# Patient Record
Sex: FEMALE | Race: WHITE | NOT HISPANIC OR LATINO | Employment: FULL TIME | URBAN - METROPOLITAN AREA
[De-identification: names, ages, dates, MRNs, and addresses within clinical notes are randomized per-mention and may not be internally consistent; named-entity substitution may affect disease eponyms.]

---

## 2017-09-18 RX ORDER — DROSPIRENONE AND ETHINYL ESTRADIOL 0.03MG-3MG
KIT ORAL
Qty: 84 TABLET | Refills: 1 | Status: SHIPPED | OUTPATIENT
Start: 2017-09-18 | End: 2017-12-12 | Stop reason: SDUPTHER

## 2017-12-11 ENCOUNTER — OFFICE VISIT (OUTPATIENT)
Dept: OBSTETRICS AND GYNECOLOGY | Facility: CLINIC | Age: 27
End: 2017-12-11

## 2017-12-11 VITALS
BODY MASS INDEX: 23.55 KG/M2 | WEIGHT: 128 LBS | SYSTOLIC BLOOD PRESSURE: 102 MMHG | DIASTOLIC BLOOD PRESSURE: 68 MMHG | HEIGHT: 62 IN

## 2017-12-11 DIAGNOSIS — Z30.41 ORAL CONTRACEPTIVE PILL SURVEILLANCE: ICD-10-CM

## 2017-12-11 DIAGNOSIS — Z11.3 SCREEN FOR STD (SEXUALLY TRANSMITTED DISEASE): ICD-10-CM

## 2017-12-11 DIAGNOSIS — R87.619 ABNORMAL CERVICAL PAPANICOLAOU SMEAR, UNSPECIFIED ABNORMAL PAP FINDING: ICD-10-CM

## 2017-12-11 DIAGNOSIS — Z00.00 ENCOUNTER FOR ANNUAL GENERAL MEDICAL EXAMINATION WITHOUT ABNORMAL FINDINGS IN ADULT: Primary | ICD-10-CM

## 2017-12-11 LAB
B-HCG UR QL: NEGATIVE
BILIRUB BLD-MCNC: NEGATIVE MG/DL
CLARITY, POC: CLEAR
COLOR UR: YELLOW
GLUCOSE UR STRIP-MCNC: NEGATIVE MG/DL
INTERNAL NEGATIVE CONTROL: NEGATIVE
INTERNAL POSITIVE CONTROL: POSITIVE
KETONES UR QL: NEGATIVE
LEUKOCYTE EST, POC: NEGATIVE
Lab: NORMAL
NITRITE UR-MCNC: NEGATIVE MG/ML
PH UR: 6 [PH] (ref 5–8)
PROT UR STRIP-MCNC: NEGATIVE MG/DL
RBC # UR STRIP: NEGATIVE /UL
SP GR UR: 1.02 (ref 1–1.03)
UROBILINOGEN UR QL: NORMAL

## 2017-12-11 PROCEDURE — 81002 URINALYSIS NONAUTO W/O SCOPE: CPT | Performed by: OBSTETRICS & GYNECOLOGY

## 2017-12-11 PROCEDURE — 81025 URINE PREGNANCY TEST: CPT | Performed by: OBSTETRICS & GYNECOLOGY

## 2017-12-11 PROCEDURE — 99395 PREV VISIT EST AGE 18-39: CPT | Performed by: OBSTETRICS & GYNECOLOGY

## 2017-12-11 RX ORDER — SPIRONOLACTONE 50 MG/1
TABLET, FILM COATED ORAL
COMMUNITY
Start: 2017-12-05 | End: 2018-11-12 | Stop reason: SDUPTHER

## 2017-12-11 RX ORDER — MOXIFLOXACIN 5 MG/ML
SOLUTION/ DROPS OPHTHALMIC
Refills: 0 | COMMUNITY
Start: 2017-09-18 | End: 2017-12-11

## 2017-12-11 RX ORDER — ALPRAZOLAM 0.5 MG/1
TABLET ORAL
COMMUNITY
Start: 2017-09-18 | End: 2017-12-11

## 2017-12-11 RX ORDER — PREDNISOLONE ACETATE 10 MG/ML
SUSPENSION/ DROPS OPHTHALMIC
COMMUNITY
Start: 2017-09-15 | End: 2017-12-11

## 2017-12-11 RX ORDER — FLUCONAZOLE 200 MG/1
TABLET ORAL
COMMUNITY
Start: 2017-11-03 | End: 2017-12-11

## 2017-12-12 PROBLEM — R87.619 ABNORMAL PAP SMEAR OF CERVIX: Status: ACTIVE | Noted: 2017-12-12

## 2017-12-12 LAB
CONV .: NORMAL
CYTOLOGIST CVX/VAG CYTO: NORMAL
CYTOLOGY CVX/VAG DOC THIN PREP: NORMAL
DX ICD CODE: NORMAL
HBV SURFACE AG SERPL QL IA: NEGATIVE
HCV AB S/CO SERPL IA: <0.1 S/CO RATIO (ref 0–0.9)
HIV 1 & 2 AB SER-IMP: NORMAL
HIV 1+2 AB+HIV1 P24 AG SERPL QL IA: NON REACTIVE
HSV1 IGG SER IA-ACNC: <0.91 INDEX (ref 0–0.9)
HSV2 IGG SER IA-ACNC: <0.91 INDEX (ref 0–0.9)
OTHER STN SPEC: NORMAL
PATH REPORT.FINAL DX SPEC: NORMAL
RPR SER QL: NORMAL
STAT OF ADQ CVX/VAG CYTO-IMP: NORMAL

## 2017-12-12 RX ORDER — DROSPIRENONE AND ETHINYL ESTRADIOL 0.03MG-3MG
KIT ORAL
Qty: 84 TABLET | Refills: 3 | Status: SHIPPED | OUTPATIENT
Start: 2017-12-12 | End: 2019-01-14 | Stop reason: SDUPTHER

## 2017-12-12 NOTE — PROGRESS NOTES
GYN Annual Exam     CC- Here for annual exam.     Anup Guzman is a 27 y.o. female established patient who presents for annual well woman exam. Periods are regular every 28-30 days, lasting 5 days. She still likes her OCPs. She had an abnormal pap that was LGSIl in  and ASCUS + HPV in 2016 and never came back for colpo despite extensive counseling. She says work keeps her too busy to return for colpo.     OB History      Para Term  AB Living    1 1 1   1    SAB TAB Ectopic Multiple Live Births                  Menarche: 12  Current contraception: OCP (estrogen/progesterone)  History of abnormal Pap smear: yes - 2016- ASCUS + HPV  History of abnormal mammogram: no  Family history of uterine, colon or ovarian cancer: no  Family history of breast cancer: no  H/o STDs: trich and genital warts  Gardasil: yes    Health Maintenance   Topic Date Due   • TDAP/TD VACCINES (1 - Tdap) 2008   • PAP SMEAR  2016   • INFLUENZA VACCINE  2017       Past Medical History:   Diagnosis Date   • Abnormal Pap smear of cervix 2017    Pt has had abnormal paps in  &  and has NOT had colpo or appropriate follow up.   • Acne    • HPV (human papilloma virus) infection     h/o genital warts   • Urogenital trichomoniasis        Past Surgical History:   Procedure Laterality Date   • INDUCED            Current Outpatient Prescriptions:   •  drospirenone-ethinyl estradiol (OCELLA) 3-0.03 MG per tablet, One by mouth per day, Disp: 84 tablet, Rfl: 3  •  spironolactone (ALDACTONE) 50 MG tablet, , Disp: , Rfl:     Allergies   Allergen Reactions   • Doxycycline        Social History   Substance Use Topics   • Smoking status: Never Smoker   • Smokeless tobacco: Never Used   • Alcohol use No       Family History   Problem Relation Age of Onset   • Breast cancer Neg Hx    • Ovarian cancer Neg Hx    • Colon cancer Neg Hx        Review of Systems   Constitutional: Negative for appetite change,  "fatigue, fever and unexpected weight change.   Respiratory: Negative for cough and shortness of breath.    Cardiovascular: Negative for chest pain and palpitations.   Gastrointestinal: Negative for abdominal distention, abdominal pain, constipation, diarrhea and nausea.   Endocrine: Negative for cold intolerance and heat intolerance.   Genitourinary: Negative for dyspareunia, dysuria, menstrual problem, pelvic pain and vaginal discharge.   Skin: Negative for color change and rash.   Neurological: Negative for headaches.   Psychiatric/Behavioral: Negative for dysphoric mood. The patient is not nervous/anxious.        /68  Ht 157.5 cm (62\")  Wt 58.1 kg (128 lb)  LMP 11/16/2017  Breastfeeding? No  BMI 23.41 kg/m2    Physical Exam   Constitutional: She is oriented to person, place, and time. She appears well-developed and well-nourished.   HENT:   Head: Normocephalic and atraumatic.   Neck: No thyromegaly present.   Cardiovascular: Normal rate, regular rhythm and normal heart sounds.    Pulmonary/Chest: Effort normal and breath sounds normal. Right breast exhibits no inverted nipple, no mass, no nipple discharge, no skin change and no tenderness. Left breast exhibits no inverted nipple, no mass, no nipple discharge, no skin change and no tenderness.   Abdominal: Soft. Bowel sounds are normal. She exhibits no distension and no mass. There is no tenderness. No hernia.   Genitourinary: Uterus normal. Pelvic exam was performed with patient supine. There is no rash, tenderness or lesion on the right labia. There is no rash, tenderness or lesion on the left labia. Cervix exhibits no motion tenderness, no discharge and no friability. Right adnexum displays no mass, no tenderness and no fullness. Left adnexum displays no mass, no tenderness and no fullness. No erythema, tenderness or bleeding in the vagina. No foreign body in the vagina. No signs of injury around the vagina. No vaginal discharge found. "   Neurological: She is oriented to person, place, and time.   Skin: Skin is warm and dry.   Psychiatric: She has a normal mood and affect. Her behavior is normal. Judgment and thought content normal.   Nursing note and vitals reviewed.         Assessment/Plan    1) GYN HM: check pap. Long d/w pt that she needs colpo of her pap is abnormal. D/w pt that the people who get cervical cancer are those that do not follow up.   SBE demonstrated and encouraged.  2) STD screening: accepts.  Condoms encouraged.  3) Contraception:  Refill OCPs  4) Family Planning: no plans. , encourage folic acid daily  5) Diet and Exercise discussed  6) Smoking Status: non smoker.  7) Advised pt that spironolactone is a teratogen and she she should use 2 forms of contraception.  8) MMG- plan age 39.  9)Follow up prn or 1 year       Anup was seen today for gynecologic exam.    Diagnoses and all orders for this visit:    Encounter for annual general medical examination without abnormal findings in adult  -     POC Urinalysis Dipstick  -     POC Pregnancy, Urine  -     Pap IG, Rfx HPV ASCU - ThinPrep Vial, Cervix  -     Chlamydia trachomatis, Neisseria gonorrhoeae, Trichomonas vaginalis, PCR - Urine, Urine, Clean Catch  -     Hepatitis B Surface Antigen  -     Hepatitis C Antibody  -     HIV-1 / O / 2 Ag / Antibody 4th Generation  -     HSV 1 & 2 - Specific Antibody, IgG  -     RPR    Abnormal cervical Papanicolaou smear, unspecified abnormal pap finding    Screen for STD (sexually transmitted disease)    Oral contraceptive pill surveillance    Other orders  -     drospirenone-ethinyl estradiol (OCELLA) 3-0.03 MG per tablet; One by mouth per day          Dasha Kirby MD  12/12/2017  6:41 PM

## 2017-12-14 LAB
C TRACH RRNA SPEC QL NAA+PROBE: NEGATIVE
N GONORRHOEA RRNA SPEC QL NAA+PROBE: NEGATIVE
T VAGINALIS RRNA SPEC QL NAA+PROBE: NEGATIVE

## 2018-11-12 ENCOUNTER — OFFICE VISIT (OUTPATIENT)
Dept: SURGERY | Facility: CLINIC | Age: 28
End: 2018-11-12

## 2018-11-12 VITALS
SYSTOLIC BLOOD PRESSURE: 112 MMHG | HEIGHT: 62 IN | WEIGHT: 126.4 LBS | BODY MASS INDEX: 23.26 KG/M2 | DIASTOLIC BLOOD PRESSURE: 66 MMHG

## 2018-11-12 DIAGNOSIS — K64.5 THROMBOSED HEMORRHOIDS: Primary | ICD-10-CM

## 2018-11-12 PROCEDURE — 99202 OFFICE O/P NEW SF 15 MIN: CPT | Performed by: SURGERY

## 2018-11-12 RX ORDER — SPIRONOLACTONE 50 MG/1
50 TABLET, FILM COATED ORAL
COMMUNITY
End: 2019-12-18

## 2018-11-12 NOTE — PROGRESS NOTES
PATIENT INFORMATION  Anup Guzman       - 1990    CHIEF COMPLAINT  Chief Complaint   Patient presents with   • Hemorrhoids       HISTORY OF PRESENT ILLNESS  HPI she complains of one week history of painful swelling in her perianal area.  She has had some mild bleeding in the past but nothing significant.  She denies any fevers or chills or drainage from the area.  She says the pain and swelling have improved over the last several days.        REVIEW OF SYSTEMS  Review of Systems she drinks a fair amount of water per day but does not necessarily eat much fiber in her diet.  Otherwise negative      ACTIVE PROBLEMS  Patient Active Problem List    Diagnosis   • Abnormal Pap smear of cervix [R87.619]   • Cerumen impaction [H61.20]   • CD (contact dermatitis) [L25.9]   • Blues [F32.9]   • Fatigue [R53.83]   • Congested [YHL3253]   • Abnormal presence of protein in urine [R80.9]   • Acne vulgaris [L70.0]   • Onychia of toe of left foot [L03.032]         PAST MEDICAL HISTORY  Past Medical History:   Diagnosis Date   • Abnormal Pap smear of cervix 2017    Pt has had abnormal paps in  &  and has NOT had colpo or appropriate follow up.   • Acne    • HPV (human papilloma virus) infection     h/o genital warts   • Urogenital trichomoniasis          SURGICAL HISTORY  Past Surgical History:   Procedure Laterality Date   • INDUCED            FAMILY HISTORY  Family History   Problem Relation Age of Onset   • Breast cancer Neg Hx    • Ovarian cancer Neg Hx    • Colon cancer Neg Hx          SOCIAL HISTORY  Social History     Occupational History   • Not on file   Tobacco Use   • Smoking status: Never Smoker   • Smokeless tobacco: Never Used   Substance and Sexual Activity   • Alcohol use: No   • Drug use: No   • Sexual activity: Yes     Partners: Male     Birth control/protection: OCP       Debilities/Disabilities Identified: None    Emotional Behavior: Appropriate    CURRENT MEDICATIONS    Current  "Outpatient Medications:   •  drospirenone-ethinyl estradiol (OCELLA) 3-0.03 MG per tablet, One by mouth per day, Disp: 84 tablet, Rfl: 3  •  spironolactone (ALDACTONE) 50 MG tablet, Take 50 mg by mouth., Disp: , Rfl:     ALLERGIES  Doxycycline    VITALS  Vitals:    11/12/18 1428   BP: 112/66   Weight: 57.3 kg (126 lb 6.4 oz)   Height: 157.5 cm (62.01\")       LAST RESULTS   Office Visit on 12/11/2017   Component Date Value Ref Range Status   • Color 12/11/2017 Yellow  Yellow, Straw, Dark Yellow, Joy Final   • Clarity, UA 12/11/2017 Clear  Clear Final   • Glucose, UA 12/11/2017 Negative  Negative, 1000 mg/dL (3+) mg/dL Final   • Bilirubin 12/11/2017 Negative  Negative Final   • Ketones, UA 12/11/2017 Negative  Negative Final   • Specific Gravity  12/11/2017 1.025  1.005 - 1.030 Final   • Blood, UA 12/11/2017 Negative  Negative Final   • pH, Urine 12/11/2017 6.0  5.0 - 8.0 Final   • Protein, POC 12/11/2017 Negative  Negative mg/dL Final   • Urobilinogen, UA 12/11/2017 Normal  Normal Final   • Leukocytes 12/11/2017 Negative  Negative Final   • Nitrite, UA 12/11/2017 Negative  Negative Final   • HCG, Urine, QL 12/11/2017 Negative  Negative Final   • Lot Number 12/11/2017 [id number]   Final   • Internal Positive Control 12/11/2017 Positive   Final   • Internal Negative Control 12/11/2017 Negative   Final   • Diagnosis 12/11/2017 Comment   Final    Comment: NEGATIVE FOR INTRAEPITHELIAL LESION AND MALIGNANCY.  CELLULAR CHANGES ASSOCIATED WITH INFLAMMATION ARE PRESENT.     • Specimen adequacy: 12/11/2017 Comment   Final    Comment: Satisfactory for evaluation.  Endocervical and/or squamous metaplastic  cells (endocervical component) are present.     • Clinician Provided ICD-10: 12/11/2017 Comment   Final    Z00.00   • Performed by: 12/11/2017 Comment   Final    Rody Jauregui Cytotechnologist (ASCP)   • . 12/11/2017 .   Final   • Note: 12/11/2017 Comment   Final    Comment: The Pap smear is a screening test designed to aid in " the detection of  premalignant and malignant conditions of the uterine cervix.  It is not a  diagnostic procedure and should not be used as the sole means of detecting  cervical cancer.  Both false-positive and false-negative reports do occur.     • Method: 12/11/2017 Comment   Final    Comment: This liquid based ThinPrep(R) pap test was screened with the  use of an image guided system.     • Conv .conv 12/11/2017 Comment   Final    Comment: The HPV DNA reflex criteria were not met with this specimen result  therefore, no HPV testing was performed.     • Chlamydia trachomatis, BALTAZAR 12/11/2017 Negative  Negative Final   • Gonococcus by BALTAZAR 12/11/2017 Negative  Negative Final   • Trichomonas vaginosis 12/11/2017 Negative  Negative Final   • Hepatitis B Surface Ag 12/11/2017 Negative  Negative Final   • Hep C Virus Ab 12/11/2017 <0.1  0.0 - 0.9 s/co ratio Final    Comment:                                   Negative:     < 0.8                               Indeterminate: 0.8 - 0.9                                    Positive:     > 0.9   The CDC recommends that a positive HCV antibody result   be followed up with a HCV Nucleic Acid Amplification   test (086806).     • HIV Screen 4th Gen w/RFX (Referenc* 12/11/2017 Non Reactive  Non Reactive Final   • HSV 1 IgG, Type Specific 12/11/2017 <0.91  0.00 - 0.90 index Final    Comment:                                  Negative        <0.91                                   Equivocal 0.91 - 1.09                                   Positive        >1.09   Note: Negative indicates no antibodies detected to   HSV-1. Equivocal may suggest early infection.  If   clinically appropriate, retest at later date. Positive   indicates antibodies detected to HSV-1.     • HSV II IgG,Type Specific 12/11/2017 <0.91  0.00 - 0.90 index Final    Comment:                                  Negative        <0.91                                   Equivocal 0.91 - 1.09                                    Positive        >1.09   Note: Negative indicates no antibodies detected to   HSV-2. Equivocal may suggest early infection.  If   clinically appropriate, retest at later date. Positive   indicates antibodies detected to HSV-2.     • RPR 12/11/2017 Comment  Non-Reactive Final    Non-Reactive     No results found.    PHYSICAL EXAM  Physical Exam this alert white female in no active distress.  She has a single thrombosed hemorrhoid with some also minor skin tags.  There is no evidence of abscess or cellulitis.  There is no evidence of necrosis.    ASSESSMENT  Thrombosed hemorrhoid      PLAN  The risks benefits options were discussed with her in detail.  Her time of disability will be last with conservative treatment since she is a week into this at this point.  I recommended she start 3 times a day sitz baths I do not feel that she needs a steroid cream.  I would like to see her back in 2-3 weeks.

## 2018-11-15 ENCOUNTER — TELEPHONE (OUTPATIENT)
Dept: GASTROENTEROLOGY | Facility: CLINIC | Age: 28
End: 2018-11-15

## 2018-12-12 ENCOUNTER — OFFICE VISIT (OUTPATIENT)
Dept: OBSTETRICS AND GYNECOLOGY | Facility: CLINIC | Age: 28
End: 2018-12-12

## 2018-12-12 VITALS
DIASTOLIC BLOOD PRESSURE: 60 MMHG | HEIGHT: 62 IN | WEIGHT: 125.8 LBS | BODY MASS INDEX: 23.15 KG/M2 | SYSTOLIC BLOOD PRESSURE: 98 MMHG

## 2018-12-12 DIAGNOSIS — Z01.419 WELL WOMAN EXAM WITH ROUTINE GYNECOLOGICAL EXAM: Primary | ICD-10-CM

## 2018-12-12 DIAGNOSIS — Z13.9 SCREENING FOR CONDITION: ICD-10-CM

## 2018-12-12 DIAGNOSIS — Z79.899 USE TERATOGENIC MEDICATION IN FEMALE OF REPRODUCTIVE AGE: ICD-10-CM

## 2018-12-12 DIAGNOSIS — Z30.41 ORAL CONTRACEPTIVE PILL SURVEILLANCE: ICD-10-CM

## 2018-12-12 DIAGNOSIS — Z11.3 SCREENING EXAMINATION FOR STD (SEXUALLY TRANSMITTED DISEASE): ICD-10-CM

## 2018-12-12 PROBLEM — R87.619 ABNORMAL PAP SMEAR OF CERVIX: Status: RESOLVED | Noted: 2017-12-12 | Resolved: 2018-12-12

## 2018-12-12 LAB
B-HCG UR QL: NEGATIVE
BILIRUB BLD-MCNC: NEGATIVE MG/DL
CLARITY, POC: CLEAR
COLOR UR: YELLOW
GLUCOSE UR STRIP-MCNC: NEGATIVE MG/DL
INTERNAL NEGATIVE CONTROL: NEGATIVE
INTERNAL POSITIVE CONTROL: POSITIVE
KETONES UR QL: NEGATIVE
LEUKOCYTE EST, POC: NEGATIVE
Lab: NORMAL
NITRITE UR-MCNC: NEGATIVE MG/ML
PH UR: 5 [PH] (ref 5–8)
PROT UR STRIP-MCNC: NEGATIVE MG/DL
RBC # UR STRIP: NEGATIVE /UL
SP GR UR: 1 (ref 1–1.03)
UROBILINOGEN UR QL: NORMAL

## 2018-12-12 PROCEDURE — 81025 URINE PREGNANCY TEST: CPT | Performed by: OBSTETRICS & GYNECOLOGY

## 2018-12-12 PROCEDURE — 99395 PREV VISIT EST AGE 18-39: CPT | Performed by: OBSTETRICS & GYNECOLOGY

## 2018-12-12 PROCEDURE — 81002 URINALYSIS NONAUTO W/O SCOPE: CPT | Performed by: OBSTETRICS & GYNECOLOGY

## 2018-12-12 NOTE — PROGRESS NOTES
GYN Annual Exam     CC- Here for annual exam.     Anup Guzman is a 28 y.o. female established patient who presents for annual well woman exam. Periods are regular every 28-30 days, lasting 5 days. She would like to take OCPs continuously to avoid periods and we discussed DUB a/w cont OCPs use and she understands. She has had abnormal paps in 2015 & 2016 but 2017 was normal. She had a bug bite on her mons 3 days ago, now resolving.     OB History      Para Term  AB Living    1 0 0   1 0    SAB TAB Ectopic Molar Multiple Live Births      1                Obstetric Comments    1 VIP          Menarche: 12  Current contraception: OCP (estrogen/progesterone)  History of abnormal Pap smear: yes -  & 2016 abnl, 2017 normal  History of abnormal mammogram: no  Family history of uterine, colon or ovarian cancer: no  Family history of breast cancer: no  H/o STDs: h/o trich and genital warts  Gardasil: 3/3  12/11/17- normal pap    Health Maintenance   Topic Date Due   • HEPATITIS A VACCINE ADULT (1 of 2) 2007   • TDAP/TD VACCINES (1 - Tdap) 2008   • INFLUENZA VACCINE  2018   • ANNUAL PHYSICAL  2018   • PAP SMEAR  2020       Past Medical History:   Diagnosis Date   • Abnormal Pap smear of cervix 2017- LGSIl, 2016- ASCUS + HPV,  normal   • Acne    • History of genital warts    • HPV (human papilloma virus) infection     h/o genital warts   • Urogenital trichomoniasis        Past Surgical History:   Procedure Laterality Date   • INDUCED            Current Outpatient Medications:   •  drospirenone-ethinyl estradiol (OCELLA) 3-0.03 MG per tablet, One by mouth per day, Disp: 84 tablet, Rfl: 3  •  hydrocortisone (ANUSOL-HC) 2.5 % rectal cream, Insert  into the rectum 2 (Two) Times a Day., Disp: 28.35 g, Rfl: 2  •  spironolactone (ALDACTONE) 50 MG tablet, Take 50 mg by mouth., Disp: , Rfl:     Allergies   Allergen Reactions   • Doxycycline Anaphylaxis  "      Social History     Tobacco Use   • Smoking status: Never Smoker   • Smokeless tobacco: Never Used   Substance Use Topics   • Alcohol use: No   • Drug use: No       Family History   Problem Relation Age of Onset   • No Known Problems Father    • No Known Problems Mother    • Breast cancer Neg Hx    • Ovarian cancer Neg Hx    • Colon cancer Neg Hx        Review of Systems   Constitutional: Negative for appetite change, fatigue, fever and unexpected weight change.   Respiratory: Negative for cough and shortness of breath.    Cardiovascular: Negative for chest pain and palpitations.   Gastrointestinal: Negative for abdominal distention, abdominal pain, constipation, diarrhea and nausea.   Endocrine: Negative for cold intolerance.   Genitourinary: Negative for dyspareunia, dysuria, menstrual problem, pelvic pain and vaginal discharge.   Skin: Positive for wound (bug bite). Negative for color change and rash.   Neurological: Negative for headaches.   Psychiatric/Behavioral: Negative for dysphoric mood. The patient is not nervous/anxious.        BP 98/60   Ht 157.5 cm (62\")   Wt 57.1 kg (125 lb 12.8 oz)   LMP 10/01/2018 (Approximate)   BMI 23.01 kg/m²     Physical Exam   Constitutional: She is oriented to person, place, and time. She appears well-developed and well-nourished.   HENT:   Head: Normocephalic and atraumatic.   Eyes: Conjunctivae are normal. No scleral icterus.   Neck: Neck supple. No thyromegaly present.   Cardiovascular: Normal rate and regular rhythm.   Pulmonary/Chest: Effort normal and breath sounds normal. Right breast exhibits no inverted nipple, no mass, no nipple discharge, no skin change and no tenderness. Left breast exhibits no inverted nipple, no mass, no nipple discharge, no skin change and no tenderness.   Abdominal: Soft. Bowel sounds are normal. She exhibits no distension and no mass. There is no tenderness. There is no rebound and no guarding. No hernia.   Genitourinary: Uterus " normal.       Pelvic exam was performed with patient supine. There is lesion on the right labia. There is no rash or tenderness on the right labia. There is no rash, tenderness or lesion on the left labia. Cervix exhibits no motion tenderness, no discharge and no friability. Right adnexum displays no mass, no tenderness and no fullness. Left adnexum displays no mass, no tenderness and no fullness. No erythema, tenderness or bleeding in the vagina. No foreign body in the vagina. No signs of injury around the vagina. No vaginal discharge found.   Neurological: She is alert and oriented to person, place, and time.   Skin: Skin is warm and dry.   Psychiatric: She has a normal mood and affect. Her behavior is normal. Judgment and thought content normal.   Nursing note and vitals reviewed.         Assessment/Plan    1) GYN HM: check pap/C/G/T   SBE demonstrated and encouraged.  2) STD screening: accepts Condoms encouraged.  3) Contraception:  Refill OCPS, 90days. DUB side effect of continuous OCP use d/w pt/   4) Family Planning: no plasn, encourage folic acid daily  5) Diet and Exercise discussed  6) Smoking Status: non smoker  7) Acne- on aldactone- d/w pt that this is a teratogen and she should be on 2 forms of birth control during use and off for 3 months prior to conception.   8) MMG-  Plana age 39  9)Follow up prn or 1 year  10) Bug bite- small amount of erythema, no e/o infection. Pt states it is already better after 1 day.        Anup was seen today for gynecologic exam.    Diagnoses and all orders for this visit:    Well woman exam with routine gynecological exam  -     Cancel: Pap IG, Rfx HPV ASCU  -     Pap IG, Ct-Ng TV Rfx HPV ASCU  -     Hepatitis B Surface Antigen  -     Hepatitis C Antibody  -     HIV-1 / O / 2 Ag / Antibody 4th Generation  -     HSV 1 & 2 - Specific Antibody, IgG  -     RPR    Screening for condition  -     POC Urinalysis Dipstick  -     POC Pregnancy, Urine    Screening examination for  STD (sexually transmitted disease)    Oral contraceptive pill surveillance    Use teratogenic medication in female of reproductive age          Dasha Kirby MD  12/12/2018  1:01 PM

## 2018-12-14 LAB
HBV SURFACE AG SERPL QL IA: NEGATIVE
HCV AB S/CO SERPL IA: <0.1 S/CO RATIO (ref 0–0.9)
HIV 1+2 AB+HIV1 P24 AG SERPL QL IA: NON REACTIVE
HSV1 IGG SER IA-ACNC: <0.91 INDEX (ref 0–0.9)
HSV2 IGG SER IA-ACNC: <0.91 INDEX (ref 0–0.9)
RPR SER QL: NORMAL

## 2018-12-19 LAB
C TRACH RRNA CVX QL NAA+PROBE: NEGATIVE
CONV .: NORMAL
CYTOLOGIST CVX/VAG CYTO: NORMAL
CYTOLOGY CVX/VAG DOC THIN PREP: NORMAL
DX ICD CODE: NORMAL
HIV 1 & 2 AB SER-IMP: NORMAL
Lab: NORMAL
N GONORRHOEA RRNA CVX QL NAA+PROBE: NEGATIVE
OTHER STN SPEC: NORMAL
PATH REPORT.FINAL DX SPEC: NORMAL
STAT OF ADQ CVX/VAG CYTO-IMP: NORMAL
T VAGINALIS RRNA SPEC QL NAA+PROBE: NEGATIVE

## 2019-01-14 RX ORDER — DROSPIRENONE AND ETHINYL ESTRADIOL 0.03MG-3MG
KIT ORAL
Qty: 84 TABLET | Refills: 1 | Status: SHIPPED | OUTPATIENT
Start: 2019-01-14 | End: 2019-06-10 | Stop reason: SDUPTHER

## 2019-06-10 RX ORDER — DROSPIRENONE AND ETHINYL ESTRADIOL 0.03MG-3MG
KIT ORAL
Qty: 84 TABLET | Refills: 0 | Status: SHIPPED | OUTPATIENT
Start: 2019-06-10 | End: 2019-12-18

## 2019-12-18 ENCOUNTER — OFFICE VISIT (OUTPATIENT)
Dept: OBSTETRICS AND GYNECOLOGY | Facility: CLINIC | Age: 29
End: 2019-12-18

## 2019-12-18 VITALS
SYSTOLIC BLOOD PRESSURE: 142 MMHG | BODY MASS INDEX: 27 KG/M2 | WEIGHT: 143 LBS | HEIGHT: 61 IN | DIASTOLIC BLOOD PRESSURE: 90 MMHG

## 2019-12-18 DIAGNOSIS — Z13.9 SCREENING FOR CONDITION: Primary | ICD-10-CM

## 2019-12-18 DIAGNOSIS — R03.0 ELEVATED BLOOD PRESSURE READING IN OFFICE WITHOUT DIAGNOSIS OF HYPERTENSION: ICD-10-CM

## 2019-12-18 DIAGNOSIS — N92.6 MISSED MENSES: ICD-10-CM

## 2019-12-18 PROBLEM — Z79.899 USE TERATOGENIC MEDICATION IN FEMALE OF REPRODUCTIVE AGE: Status: RESOLVED | Noted: 2018-12-12 | Resolved: 2019-12-18

## 2019-12-18 LAB
B-HCG UR QL: POSITIVE
INTERNAL NEGATIVE CONTROL: NEGATIVE
INTERNAL POSITIVE CONTROL: POSITIVE
Lab: ABNORMAL

## 2019-12-18 PROCEDURE — 99214 OFFICE O/P EST MOD 30 MIN: CPT | Performed by: OBSTETRICS & GYNECOLOGY

## 2019-12-18 PROCEDURE — 81025 URINE PREGNANCY TEST: CPT | Performed by: OBSTETRICS & GYNECOLOGY

## 2019-12-18 RX ORDER — PRENATAL VIT/IRON FUM/FOLIC AC 27MG-0.8MG
TABLET ORAL DAILY
COMMUNITY

## 2019-12-18 NOTE — PROGRESS NOTES
GYN Annual Exam     CC- Here for annual exam.     Anup Guzman is a 29 y.o. female established patient who presents for missed menses.  Her LMP was 10/30/2019 cycles have been irregular since going off the pill..  She called before her appointment to ensure that we did not discuss any of her gynecologic or obstetric history with her current partner.  She currently does not have any obstetric coverage through her insurance and is attempting to apply for Medicaid.  She has very mild nausea but no bleeding.  OB History        1    Para   0    Term   0            AB   1    Living   0       SAB        TAB   1    Ectopic        Molar        Multiple        Live Births              Obstetric Comments   1 VIP             Menarche: 12  Current contraception: OCP (estrogen/progesterone)  History of abnormal Pap smear: yes -  & 2016 abnl, 2017 normal  History of abnormal mammogram: no  Family history of uterine, colon or ovarian cancer: no  Family history of breast cancer: no  H/o STDs: h/o trich and genital warts  Gardasil: 3/3  2018- nl pap    Health Maintenance   Topic Date Due   • TDAP/TD VACCINES (1 - Tdap) 2000   • ANNUAL PHYSICAL  2018   • INFLUENZA VACCINE  2019   • Annual Gynecologic Pelvic and Breast Exam  2019   • PAP SMEAR  2021       Past Medical History:   Diagnosis Date   • Abnormal Pap smear of cervix 2017- LGSIl, 2016- ASCUS + HPV, 2017 normal   • Acne    • History of genital warts    • HPV (human papilloma virus) infection     h/o genital warts   • Urogenital trichomoniasis        Past Surgical History:   Procedure Laterality Date   • INDUCED            Current Outpatient Medications:   •  Prenatal Vit-Fe Fumarate-FA (PRENATAL VITAMIN 27-0.8) 27-0.8 MG tablet tablet, Take  by mouth Daily., Disp: , Rfl:     Allergies   Allergen Reactions   • Doxycycline Anaphylaxis       Social History     Tobacco Use   • Smoking status: Never Smoker  "  • Smokeless tobacco: Never Used   Substance Use Topics   • Alcohol use: No   • Drug use: No       Family History   Problem Relation Age of Onset   • No Known Problems Father    • No Known Problems Mother    • Breast cancer Neg Hx    • Ovarian cancer Neg Hx    • Colon cancer Neg Hx        Review of Systems   Constitutional: Negative for appetite change, fatigue, fever and unexpected weight change.   Respiratory: Negative for cough and shortness of breath.    Cardiovascular: Negative for chest pain and palpitations.   Gastrointestinal: Positive for nausea (mild). Negative for abdominal distention, abdominal pain, constipation and diarrhea.   Endocrine: Negative for cold intolerance.   Genitourinary: Negative for dyspareunia, dysuria, menstrual problem, pelvic pain and vaginal discharge.   Skin: Negative for color change, rash and wound.   Neurological: Negative for headaches.   Psychiatric/Behavioral: Negative for dysphoric mood. The patient is not nervous/anxious.    All other systems reviewed and are negative.      /90   Ht 154.9 cm (61\")   Wt 64.9 kg (143 lb)   LMP 10/30/2019   Breastfeeding No   BMI 27.02 kg/m²     Physical Exam   Constitutional: She is oriented to person, place, and time. She appears well-developed and well-nourished.   HENT:   Head: Normocephalic and atraumatic.   Eyes: Conjunctivae are normal. No scleral icterus.   Neck: Neck supple. No thyromegaly present.   Cardiovascular: Normal rate and regular rhythm.   Pulmonary/Chest: Effort normal and breath sounds normal. Right breast exhibits no inverted nipple, no mass, no nipple discharge, no skin change and no tenderness. Left breast exhibits no inverted nipple, no mass, no nipple discharge, no skin change and no tenderness.   Abdominal: Soft. Bowel sounds are normal. She exhibits no distension and no mass. There is no tenderness. There is no rebound and no guarding. No hernia.   Bedside ultrasound reveals single embryo with cardiac " activity   Neurological: She is alert and oriented to person, place, and time.   Skin: Skin is warm and dry.   Psychiatric: She has a normal mood and affect. Her behavior is normal. Judgment and thought content normal.   Nursing note and vitals reviewed.         Assessment/Plan    1. Misses menses-patient will be 7 weeks by dates, however, can easily see an embryo on bedside ultrasound.  There is lynn pregnancy with cardiac activity.  I suspect she may be further along 1 7 weeks.  2.?  Chronic hypertension- patient has mildly elevated blood pressure today and it looks like she had elevated blood pressure in March of this year as well.  She is not on any medication and is not aware that she has hypertension.  We did discuss that we will continue to follow her pressures closely in the first trimester.  Any blood pressure elevation prior to 20 weeks indicates chronic hypertension.  3.  Status post flu shot  4.  History of Aldactone use-patient was off Aldactone 4 months prior to conception.  5.  Desires privacy-patient does not want her partner to know about her history of trichomoniasis, genital warts or .  6. RTO 3 weeks NOB and US dates.  Patient does not have insurance at present and is applying for Medicaid.  Practice structure explained.  Patient given information on NIPT as well as genetic carrier screening.    Anup was seen today for amenorrhea.    Diagnoses and all orders for this visit:    Screening for condition  -     POC Pregnancy, Urine    Missed menses          Dasha Kirby MD  2019  1:05 PM

## 2020-01-08 ENCOUNTER — INITIAL PRENATAL (OUTPATIENT)
Dept: OBSTETRICS AND GYNECOLOGY | Facility: CLINIC | Age: 30
End: 2020-01-08

## 2020-01-08 ENCOUNTER — PROCEDURE VISIT (OUTPATIENT)
Dept: OBSTETRICS AND GYNECOLOGY | Facility: CLINIC | Age: 30
End: 2020-01-08

## 2020-01-08 VITALS — BODY MASS INDEX: 27.02 KG/M2 | SYSTOLIC BLOOD PRESSURE: 112 MMHG | DIASTOLIC BLOOD PRESSURE: 74 MMHG | WEIGHT: 143 LBS

## 2020-01-08 DIAGNOSIS — Z36.9 ENCOUNTER FOR ANTENATAL SCREENING, UNSPECIFIED: ICD-10-CM

## 2020-01-08 DIAGNOSIS — Z3A.11 11 WEEKS GESTATION OF PREGNANCY: Primary | ICD-10-CM

## 2020-01-08 DIAGNOSIS — O36.80X0 ENCOUNTER TO DETERMINE FETAL VIABILITY OF PREGNANCY, SINGLE OR UNSPECIFIED FETUS: Primary | ICD-10-CM

## 2020-01-08 LAB
GLUCOSE UR STRIP-MCNC: NEGATIVE MG/DL
PROT UR STRIP-MCNC: NEGATIVE MG/DL

## 2020-01-08 PROCEDURE — 76801 OB US < 14 WKS SINGLE FETUS: CPT | Performed by: NURSE PRACTITIONER

## 2020-01-08 PROCEDURE — 0501F PRENATAL FLOW SHEET: CPT | Performed by: NURSE PRACTITIONER

## 2020-01-08 NOTE — PROGRESS NOTES
"Initial ob visit     Chief Complaint   Patient presents with   • Initial Prenatal Visit       Anup Guzman is being seen today for her first obstetrical visit.  She is a 29 y.o.    11w1d gestation. She currently does not have insurance coverage. She has applied for medicaid but was denied for a \"technical detail.\" She reports she has reapplied with all the correct information they requested.     #: 1, Date: None, Sex: None, Weight: None, GA: None, Delivery: None, Apgar1: None, Apgar5: None, Living: None, Birth Comments: None    #: 2, Date: None, Sex: None, Weight: None, GA: None, Delivery: None, Apgar1: None, Apgar5: None, Living: None, Birth Comments: None      LNMP: 10/30/19  Confident with date: Yes, but h/o irreg menses since stopping OCPs  Taking prenatal vitamins: Yes  Planned pregnancy: Yes  Prior obstetric issues, potential pregnancy concerns: H/O TAB  Family history of genetic issues (includes FOB): FOB niece- no DX, chromosome abnormality with chromosome 16, had multiple abnormalities (Think drug use was involved).   Prior infections concerning in pregnancy (Rash, fever in last 2 weeks): denies  Varicella Hx: disease as child  Flu vaccine: S/P flu vaccine 10/19  History of STDs: denies  Current medications: PNV   Last pap smear:   Smoker: No  Drug or alcohol abuse: No  Prior testing for Cystic Fibrosis Carrier or Sickle Cell Trait- no  Prepregnancy BMI: Body mass index is 27.02 kg/m².    Past Medical History:   Diagnosis Date   • Abnormal Pap smear of cervix 2017- LGSIl, 2016- ASCUS + HPV, 2017 normal   • Acne    • History of genital warts    • HPV (human papilloma virus) infection     h/o genital warts   • Urogenital trichomoniasis        Past Surgical History:   Procedure Laterality Date   • INDUCED            Current Outpatient Medications:   •  Prenatal Vit-Fe Fumarate-FA (PRENATAL VITAMIN 27-0.8) 27-0.8 MG tablet tablet, Take  by mouth Daily., Disp: , Rfl: "     Allergies   Allergen Reactions   • Doxycycline Anaphylaxis       Social History     Socioeconomic History   • Marital status: Single     Spouse name: Not on file   • Number of children: Not on file   • Years of education: Not on file   • Highest education level: Not on file   Tobacco Use   • Smoking status: Never Smoker   • Smokeless tobacco: Never Used   Substance and Sexual Activity   • Alcohol use: No   • Drug use: No   • Sexual activity: Yes     Partners: Male       Family History   Problem Relation Age of Onset   • No Known Problems Father    • No Known Problems Mother    • Diabetes Maternal Grandmother    • Breast cancer Neg Hx    • Ovarian cancer Neg Hx    • Colon cancer Neg Hx        Review of systems     A comprehensive review of systems was negative except for: + difficulty sleeping      Objective    /74   Wt 64.9 kg (143 lb)   LMP 10/30/2019   BMI 27.02 kg/m²            General: NAD, A&O x 3  Lungs: Regular and even  Extremities: No edema   Skin: Warm and dry                                                                  Assessment/Plan    1) Pregnancy at 11w1d- US IMP: Single, viable IUp @ 11.1 weeks. US findings discussed with patient and partner. EDC established 7/28/20 and confirmed by US. She reports irregular menses once stopping her OCPs.     2) OB exam: OB exam completed: No. Pt does not have insurance. She was given a quote on pap and labs. Disc that medicaid if approved with back date for 30-90 days. She declines exam and labs today. She does want to do NIPS today.     3) Labs: OB labs collected: No Counseled on genetic screening: Yes, she declines CF. Counseled on Quad screen and AFP: Yes, she declines QUAD or AFP. Counseled on NIPS: Yes, she desires NIPS.      4) Patient's Body mass index is 27.02 kg/m². BMI is above normal parameters. Recommendations include: educational material and exercise counseling. During this visit, approx 3-5 minutes counseling the patient regarding  smoking cessation. PT COUNSELED TO QUIT SMOKING IN PREGNANCY.  She has been informed that cigarette smoking is associated with increased incidence of  birth, growth restriction, SIDS, et. Disc that smoking cessation is the single most important thing she can do to improve the pregnancy outcome.  She verbalized understanding to this discussion.      5)  Prenatal care: Oriented to the office and prenatal care. Encourage prenatal vitamins. Disc Tylenol products are fine, avoid aspirin and ibuprofen; Zika (travel restrictions/ok to use insect repellant); not to change cat litter; food restrictions; exercise;  avoidance of alcohol, tobacco, drugs and saunas/hot tubs.     6) Genetic defect- FOB's niece born with unknown genetic disorder. Reports she had an issue with chromosome 16 but did not have a diagnosis. She passed away before 1 year of life. Checking NIPS today.     7) Sleep difficulty- Safe med list provided.    8) H/O Elevated BP- Will monitor. BP normal today.      9) Birth plans- Pt asking questions regarding birth plan. Disc birth plan and purpose of. Disc delayed cord clamping, kangaroo care, delayed bathing, etc. Enc prenatal classes.       All questions answered.     RTO 2 weeks for OB exam and labs.     Counseling was given to patient and family for the following topics: instructions for management, prognosis, patient and family education, importance of treatment compliance and labs . Total time of the encounter was 30 minutes and 25 minutes was spend counseling.      Anne-Marie Cox, APRN    2020  3:27 PM

## 2020-01-13 ENCOUNTER — RESULTS ENCOUNTER (OUTPATIENT)
Dept: OBSTETRICS AND GYNECOLOGY | Facility: CLINIC | Age: 30
End: 2020-01-13

## 2020-01-13 DIAGNOSIS — Z36.9 ENCOUNTER FOR ANTENATAL SCREENING, UNSPECIFIED: ICD-10-CM

## 2020-01-21 ENCOUNTER — ROUTINE PRENATAL (OUTPATIENT)
Dept: OBSTETRICS AND GYNECOLOGY | Facility: CLINIC | Age: 30
End: 2020-01-21

## 2020-01-21 VITALS — SYSTOLIC BLOOD PRESSURE: 112 MMHG | DIASTOLIC BLOOD PRESSURE: 68 MMHG | BODY MASS INDEX: 27.21 KG/M2 | WEIGHT: 144 LBS

## 2020-01-21 DIAGNOSIS — Z01.419 PAP SMEAR, LOW-RISK: ICD-10-CM

## 2020-01-21 DIAGNOSIS — Z34.92 NORMAL PREGNANCY IN SECOND TRIMESTER: Primary | ICD-10-CM

## 2020-01-21 DIAGNOSIS — Z36.9 ENCOUNTER FOR ANTENATAL SCREENING, UNSPECIFIED: ICD-10-CM

## 2020-01-21 DIAGNOSIS — Z11.51 SPECIAL SCREENING EXAMINATION FOR HUMAN PAPILLOMAVIRUS (HPV): ICD-10-CM

## 2020-01-21 LAB
EXTERNAL NIPT: NORMAL
GLUCOSE UR STRIP-MCNC: NEGATIVE MG/DL
PROT UR STRIP-MCNC: NEGATIVE MG/DL

## 2020-01-21 PROCEDURE — 99212 OFFICE O/P EST SF 10 MIN: CPT | Performed by: OBSTETRICS & GYNECOLOGY

## 2020-01-21 NOTE — PROGRESS NOTES
OB follow up     Anup Guzman is a 29 y.o.  13w0d being seen today for her obstetrical visit.  Patient reports no bleeding, no contractions and no leaking. Fetal movement: absent.    Review of Systems  No bleeding, No cramping/contractions     /68   Wt 65.3 kg (144 lb)   LMP 10/30/2019   BMI 27.21 kg/m²     FHT: present BPM   Uterine Size: 13 cm   Normal vulva vagina and cervix.  Pap taken.    Assessment/Plan:    1) 29 y.o.  -pregnancy at 13w0d    2)   Encounter Diagnoses   Name Primary?   • Normal pregnancy in second trimester Yes   • Encounter for  screening, unspecified    • Special screening examination for human papillomavirus (HPV)    • Pap smear, low-risk    Prenatal labs drawn.  Follow-up labs and Pap.    3) Reviewed this stage of pregnancy  4) Problem list updated     Return in about 4 weeks (around 2020) for OB Tummy.      Micky Horn MD    2020  11:45 AM

## 2020-01-22 LAB
ABO GROUP BLD: NORMAL
AMPHETAMINES UR QL SCN: NEGATIVE NG/ML
BACTERIA UR CULT: NORMAL
BACTERIA UR CULT: NORMAL
BARBITURATES UR QL SCN: NEGATIVE NG/ML
BASOPHILS # BLD AUTO: 0 X10E3/UL (ref 0–0.2)
BASOPHILS NFR BLD AUTO: 0 %
BENZODIAZ UR QL SCN: NEGATIVE NG/ML
BLD GP AB SCN SERPL QL: NEGATIVE
BZE UR QL SCN: NEGATIVE NG/ML
CANNABINOIDS UR QL SCN: NEGATIVE NG/ML
CREAT UR-MCNC: 86.6 MG/DL (ref 20–300)
EOSINOPHIL # BLD AUTO: 0 X10E3/UL (ref 0–0.4)
EOSINOPHIL NFR BLD AUTO: 1 %
ERYTHROCYTE [DISTWIDTH] IN BLOOD BY AUTOMATED COUNT: 13.5 % (ref 11.7–15.4)
HBA1C MFR BLD: 5 % (ref 4.8–5.6)
HBV SURFACE AG SERPL QL IA: NEGATIVE
HCT VFR BLD AUTO: 40.3 % (ref 34–46.6)
HCV AB S/CO SERPL IA: <0.1 S/CO RATIO (ref 0–0.9)
HGB BLD-MCNC: 13.7 G/DL (ref 11.1–15.9)
HIV 1+2 AB+HIV1 P24 AG SERPL QL IA: NON REACTIVE
IMM GRANULOCYTES # BLD AUTO: 0 X10E3/UL (ref 0–0.1)
IMM GRANULOCYTES NFR BLD AUTO: 0 %
LABORATORY COMMENT REPORT: NORMAL
LYMPHOCYTES # BLD AUTO: 2.2 X10E3/UL (ref 0.7–3.1)
LYMPHOCYTES NFR BLD AUTO: 37 %
MCH RBC QN AUTO: 29.1 PG (ref 26.6–33)
MCHC RBC AUTO-ENTMCNC: 34 G/DL (ref 31.5–35.7)
MCV RBC AUTO: 86 FL (ref 79–97)
METHADONE UR QL SCN: NEGATIVE NG/ML
MONOCYTES # BLD AUTO: 0.6 X10E3/UL (ref 0.1–0.9)
MONOCYTES NFR BLD AUTO: 10 %
NEUTROPHILS # BLD AUTO: 3.1 X10E3/UL (ref 1.4–7)
NEUTROPHILS NFR BLD AUTO: 52 %
OPIATES UR QL SCN: NEGATIVE NG/ML
OXYCODONE+OXYMORPHONE UR QL SCN: NEGATIVE NG/ML
PCP UR QL: NEGATIVE NG/ML
PH UR: 7.3 [PH] (ref 4.5–8.9)
PLATELET # BLD AUTO: 188 X10E3/UL (ref 150–450)
PROPOXYPH UR QL SCN: NEGATIVE NG/ML
RBC # BLD AUTO: 4.71 X10E6/UL (ref 3.77–5.28)
RH BLD: POSITIVE
RPR SER QL: NON REACTIVE
RUBV IGG SERPL IA-ACNC: 1.35 INDEX
WBC # BLD AUTO: 5.9 X10E3/UL (ref 3.4–10.8)

## 2020-01-23 LAB
C TRACH RRNA CVX QL NAA+PROBE: NEGATIVE
CYTOLOGIST CVX/VAG CYTO: NORMAL
CYTOLOGY CVX/VAG DOC CYTO: NORMAL
CYTOLOGY CVX/VAG DOC THIN PREP: NORMAL
DX ICD CODE: NORMAL
HIV 1 & 2 AB SER-IMP: NORMAL
HPV I/H RISK 1 DNA CVX QL PROBE+SIG AMP: NEGATIVE
N GONORRHOEA RRNA CVX QL NAA+PROBE: NEGATIVE
OTHER STN SPEC: NORMAL
STAT OF ADQ CVX/VAG CYTO-IMP: NORMAL
T VAGINALIS RRNA SPEC QL NAA+PROBE: NEGATIVE

## 2020-02-18 ENCOUNTER — ROUTINE PRENATAL (OUTPATIENT)
Dept: OBSTETRICS AND GYNECOLOGY | Facility: CLINIC | Age: 30
End: 2020-02-18

## 2020-02-18 VITALS — BODY MASS INDEX: 27.1 KG/M2 | WEIGHT: 143.4 LBS | SYSTOLIC BLOOD PRESSURE: 118 MMHG | DIASTOLIC BLOOD PRESSURE: 72 MMHG

## 2020-02-18 DIAGNOSIS — Z34.92 NORMAL PREGNANCY IN SECOND TRIMESTER: Primary | ICD-10-CM

## 2020-02-18 DIAGNOSIS — Z83.2 FAMILY HISTORY OF BLOOD COAGULATION DISORDER: ICD-10-CM

## 2020-02-18 LAB
GLUCOSE UR STRIP-MCNC: NEGATIVE MG/DL
PROT UR STRIP-MCNC: NEGATIVE MG/DL

## 2020-02-18 PROCEDURE — 99213 OFFICE O/P EST LOW 20 MIN: CPT | Performed by: OBSTETRICS & GYNECOLOGY

## 2020-02-19 DIAGNOSIS — Z83.2 FAMILY HISTORY OF BLOOD COAGULATION DISORDER: Primary | ICD-10-CM

## 2020-02-25 ENCOUNTER — APPOINTMENT (OUTPATIENT)
Dept: LAB | Facility: HOSPITAL | Age: 30
End: 2020-02-25

## 2020-02-25 ENCOUNTER — CONSULT (OUTPATIENT)
Dept: ONCOLOGY | Facility: CLINIC | Age: 30
End: 2020-02-25

## 2020-02-25 VITALS
OXYGEN SATURATION: 98 % | WEIGHT: 142.7 LBS | TEMPERATURE: 97.9 F | SYSTOLIC BLOOD PRESSURE: 110 MMHG | BODY MASS INDEX: 25.29 KG/M2 | HEIGHT: 63 IN | RESPIRATION RATE: 12 BRPM | HEART RATE: 91 BPM | DIASTOLIC BLOOD PRESSURE: 75 MMHG

## 2020-02-25 DIAGNOSIS — Z83.2 FAMILY HISTORY OF BLOOD COAGULATION DISORDER: Primary | ICD-10-CM

## 2020-02-25 LAB — FACTOR II, DNA ANALYSIS: NORMAL

## 2020-02-25 PROCEDURE — 99244 OFF/OP CNSLTJ NEW/EST MOD 40: CPT | Performed by: INTERNAL MEDICINE

## 2020-02-25 PROCEDURE — 36415 COLL VENOUS BLD VENIPUNCTURE: CPT | Performed by: INTERNAL MEDICINE

## 2020-02-25 PROCEDURE — 81240 F2 GENE: CPT | Performed by: INTERNAL MEDICINE

## 2020-02-25 NOTE — PROGRESS NOTES
Subjective     REASON FOR CONSULTATION: Family history prothrombin gene mutation  Provide an opinion on any further workup or treatment                             REQUESTING PHYSICIAN: Dr. Horn    RECORDS OBTAINED:  Records of the patients history including those obtained from the referring provider were reviewed and summarized in detail.    History of Present Illness   This is a very pleasant 29-year-old woman currently 18 weeks gestation.  The patient is seen today at the request of obstetrics for family history of a prothrombin gene mutation.  The patient states that her paternal grandmother, paternal aunt and paternal first cousin all carry the prothrombin gene mutation.  She is unsure how the mutation was diagnosed in the family but thinks maybe her aunt experienced DVT.  The patient herself has never had a venous or arterial thrombosis.  She had 1 planned  during her teenage years but no miscarriages.  She states her father has not been checked for the prothrombin gene mutation.  She reports her current pregnancy has been normal.    Past Medical History:   Diagnosis Date   • Abnormal Pap smear of cervix 2017- LGSIl, 2016- ASCUS + HPV, 2017 normal   • Acne    • Anticoagulant disorder (CMS/HCC)     family history   • History of genital warts    • HPV (human papilloma virus) infection     h/o genital warts   • Urogenital trichomoniasis         Past Surgical History:   Procedure Laterality Date   • INDUCED           Current Outpatient Medications on File Prior to Visit   Medication Sig Dispense Refill   • Prenatal Vit-Fe Fumarate-FA (PRENATAL VITAMIN 27-0.8) 27-0.8 MG tablet tablet Take  by mouth Daily.       No current facility-administered medications on file prior to visit.         ALLERGIES:    Allergies   Allergen Reactions   • Doxycycline Anaphylaxis        Social History     Socioeconomic History   • Marital status: Single     Spouse name: Not on file   • Number of  "children: Not on file   • Years of education: Not on file   • Highest education level: Not on file   Occupational History     Employer: UNKNOWN EMPLOYER   Tobacco Use   • Smoking status: Never Smoker   • Smokeless tobacco: Never Used   Substance and Sexual Activity   • Alcohol use: No   • Drug use: No   • Sexual activity: Yes     Partners: Male        Family History   Problem Relation Age of Onset   • No Known Problems Father    • No Known Problems Mother    • Diabetes Maternal Grandmother    • Factor V Leiden deficiency Other    • Breast cancer Neg Hx    • Ovarian cancer Neg Hx    • Colon cancer Neg Hx         Review of Systems   Constitutional: Negative.    HENT: Negative.    Eyes: Negative.    Respiratory: Negative.    Cardiovascular: Negative.    Gastrointestinal: Negative.    Genitourinary: Negative.    Musculoskeletal: Negative.    Skin: Negative.    Neurological: Negative.    Hematological: Negative.    Psychiatric/Behavioral: Negative.         Objective     Vitals:    02/25/20 0859   BP: 110/75   Pulse: 91   Resp: 12   Temp: 97.9 °F (36.6 °C)   TempSrc: Oral   SpO2: 98%   Weight: 64.7 kg (142 lb 11.2 oz)   Height: 159.5 cm (62.8\")   PainSc: 0-No pain     Current Status 2/25/2020   ECOG score 0       Physical Exam    CON: pleasant well-developed adult woman  HEENT: no icterus, no thrush, moist membranes  NECK: no jvd  LYMPH: no cervical or supraclavicular lad  CV: RRR, S1S2, no murmur  RESP: cta bilat, no wheezing, no rales  GI: soft, non-tender, no splenomegaly, +bs  MUSC: no edema, normal gait  NEURO: alert and oriented x3, normal strength  PSYCH: normal mood and affect    RECENT LABS:  Hematology WBC   Date Value Ref Range Status   01/21/2020 5.9 3.4 - 10.8 x10E3/uL Final     RBC   Date Value Ref Range Status   01/21/2020 4.71 3.77 - 5.28 x10E6/uL Final     Hemoglobin   Date Value Ref Range Status   01/21/2020 13.7 11.1 - 15.9 g/dL Final     Hematocrit   Date Value Ref Range Status   01/21/2020 40.3 34.0 - " 46.6 % Final     Platelets   Date Value Ref Range Status   01/21/2020 188 150 - 450 x10E3/uL Final          Assessment/Plan     Family history prothrombin gene mutation: The patient has 3 family members on the paternal side affected by the prothrombin gene mutation including her paternal grandmother, paternal aunt and paternal first cousin.  The patient's father has not been tested.  The patient has no personal history of venous or arterial thromboses and no history of miscarriages.  She would like to know if she carries the prothrombin gene mutation and the test was ordered today.  I discussed with the patient that even if she does have the prothrombin gene mutation it should not affect management of her pregnancy.  Given her negative thrombosis history and negative history for miscarriage, even if she has the prothrombin gene mutation she would not require anticoagulation during pregnancy.  I explained that if she does have the mutation it could impact future plans for contraception from gynecology etc.  I will plan to call the patient when results are available.      Thank for allowing me to participate in her care.

## 2020-02-26 ENCOUNTER — TELEPHONE (OUTPATIENT)
Dept: ONCOLOGY | Facility: CLINIC | Age: 30
End: 2020-02-26

## 2020-02-26 NOTE — TELEPHONE ENCOUNTER
Called Ms. Guzman per Dr. Trevizo and let her know that her Factor 2 gene mutation was normal/negative.  Pt acknowledged understanding.    ----- Message from Jadiel Trevizo MD sent at 2/26/2020  7:42 AM EST -----   Please let her know that her Factor 2 or Prothrombin gene mutation was normal/negative  ----- Message -----  From: Lab, Background User  Sent: 2/25/2020   9:22 PM EST  To: Jadiel Trevizo MD

## 2020-03-10 ENCOUNTER — ROUTINE PRENATAL (OUTPATIENT)
Dept: OBSTETRICS AND GYNECOLOGY | Facility: CLINIC | Age: 30
End: 2020-03-10

## 2020-03-10 ENCOUNTER — PROCEDURE VISIT (OUTPATIENT)
Dept: OBSTETRICS AND GYNECOLOGY | Facility: CLINIC | Age: 30
End: 2020-03-10

## 2020-03-10 VITALS — DIASTOLIC BLOOD PRESSURE: 70 MMHG | SYSTOLIC BLOOD PRESSURE: 110 MMHG | WEIGHT: 145 LBS | BODY MASS INDEX: 25.85 KG/M2

## 2020-03-10 DIAGNOSIS — Z34.92 PRENATAL CARE IN SECOND TRIMESTER: Primary | ICD-10-CM

## 2020-03-10 DIAGNOSIS — Z36.3 SCREENING, ANTENATAL, FOR MALFORMATION BY ULTRASOUND: Primary | ICD-10-CM

## 2020-03-10 DIAGNOSIS — Z83.2 FAMILY HISTORY OF BLOOD COAGULATION DISORDER: ICD-10-CM

## 2020-03-10 LAB
GLUCOSE UR STRIP-MCNC: NEGATIVE MG/DL
PROT UR STRIP-MCNC: NEGATIVE MG/DL

## 2020-03-10 PROCEDURE — 99213 OFFICE O/P EST LOW 20 MIN: CPT | Performed by: NURSE PRACTITIONER

## 2020-03-10 PROCEDURE — 76805 OB US >/= 14 WKS SNGL FETUS: CPT | Performed by: NURSE PRACTITIONER

## 2020-03-10 NOTE — PROGRESS NOTES
OB follow up > 20 weeks    Chief Complaint   Patient presents with   • Routine Prenatal Visit       Anup Guzman is a 29 y.o.  20w0d being seen today for her obstetrical visit.  Patient reports no complaints. Taking prenatal vitamins: Yes      Review of Systems  Constitutional: neg fatigue  Cardiovascular: neg edema  Gastrointestinal: neg n/v  Genitourinary: Negative for contractions, cramping, vaginal bleeding, or SROM.   Fetal movement: normal    /70   Wt 65.8 kg (145 lb)   LMP 10/30/2019   BMI 25.85 kg/m²     FHT: present BPM   Uterine Size: size equals dates       Assessment    1) pregnancy at 20w0d- US IMP: Normal anatomy US, female.     2) S/P flu vaccine    3) Family h/o prothrombin gene mutation- Neg workup with hematology.     4) NIPS neg. Decline AFP. Declines CF testing.     Plan    Continue prenatal vitamins  Reviewed this stage of pregnancy  Problem list updated   Follow up in 4 weeks    SHELLEY Sheridan  3/10/2020  11:35

## 2022-03-10 ENCOUNTER — OFFICE VISIT (OUTPATIENT)
Dept: OBSTETRICS AND GYNECOLOGY | Facility: CLINIC | Age: 32
End: 2022-03-10

## 2022-03-10 VITALS
BODY MASS INDEX: 25.94 KG/M2 | SYSTOLIC BLOOD PRESSURE: 134 MMHG | DIASTOLIC BLOOD PRESSURE: 72 MMHG | HEIGHT: 61 IN | WEIGHT: 137.4 LBS

## 2022-03-10 DIAGNOSIS — Z23 NEEDS FLU SHOT: ICD-10-CM

## 2022-03-10 DIAGNOSIS — N91.2 AMENORRHEA: Primary | ICD-10-CM

## 2022-03-10 DIAGNOSIS — F12.10 TETRAHYDROCANNABINOL (THC) USE DISORDER, MILD, ABUSE: ICD-10-CM

## 2022-03-10 DIAGNOSIS — O09.299 HX OF PREECLAMPSIA, PRIOR PREGNANCY, CURRENTLY PREGNANT: ICD-10-CM

## 2022-03-10 LAB
B-HCG UR QL: POSITIVE
BILIRUB BLD-MCNC: NEGATIVE MG/DL
CLARITY, POC: CLEAR
COLOR UR: YELLOW
EXPIRATION DATE: ABNORMAL
GLUCOSE UR STRIP-MCNC: NEGATIVE MG/DL
INTERNAL NEGATIVE CONTROL: NEGATIVE
INTERNAL POSITIVE CONTROL: POSITIVE
KETONES UR QL: NEGATIVE
LEUKOCYTE EST, POC: NEGATIVE
Lab: 55
NITRITE UR-MCNC: NEGATIVE MG/ML
PH UR: 5 [PH] (ref 5–8)
PROT UR STRIP-MCNC: NEGATIVE MG/DL
RBC # UR STRIP: NEGATIVE /UL
SP GR UR: 1 (ref 1–1.03)
UROBILINOGEN UR QL: NORMAL

## 2022-03-10 PROCEDURE — 90471 IMMUNIZATION ADMIN: CPT | Performed by: OBSTETRICS & GYNECOLOGY

## 2022-03-10 PROCEDURE — 81002 URINALYSIS NONAUTO W/O SCOPE: CPT | Performed by: OBSTETRICS & GYNECOLOGY

## 2022-03-10 PROCEDURE — 81025 URINE PREGNANCY TEST: CPT | Performed by: OBSTETRICS & GYNECOLOGY

## 2022-03-10 PROCEDURE — 99213 OFFICE O/P EST LOW 20 MIN: CPT | Performed by: OBSTETRICS & GYNECOLOGY

## 2022-03-10 PROCEDURE — 90686 IIV4 VACC NO PRSV 0.5 ML IM: CPT | Performed by: OBSTETRICS & GYNECOLOGY

## 2022-03-10 RX ORDER — CETIRIZINE HYDROCHLORIDE 5 MG/1
5 TABLET ORAL DAILY
COMMUNITY
End: 2022-05-25

## 2022-03-10 RX ORDER — DROSPIRENONE AND ETHINYL ESTRADIOL 0.02-3(28)
1 KIT ORAL DAILY
COMMUNITY
End: 2022-04-25

## 2022-03-10 RX ORDER — ALBUTEROL SULFATE 90 UG/1
2 AEROSOL, METERED RESPIRATORY (INHALATION)
COMMUNITY
Start: 2021-10-08 | End: 2022-05-25

## 2022-03-10 NOTE — PROGRESS NOTES
GYN Exam    CC- Here for missed menses     Anup Guzman is a 31 y.o. female established patient who presents for misses menses. Her LMP was 2022. Her US today shows a 9.2 week single embryo with . Her US confirms her LMP SUSAN of 10/11/2022. She has seen here for her last pregnancy until about 20 weeks and then moved to [place].  She was induced at 34.5 weeks for severe preeclampsia. She has a family history of prothrombin gene mutation and she was tested and was negative.     OB History        3    Para   1    Term   0       1    AB   1    Living   1       SAB        IAB   1    Ectopic        Molar        Multiple        Live Births   1          Obstetric Comments   1 VIP (family is unaware)   IOL at 34.5 weeks for severe preeclampsia             Menarche: 12  Current contraception: none  History of abnormal Pap smear: yes -  1 abnormal with normal followup  History of abnormal mammogram: no  Family history of uterine, colon or ovarian cancer: no  Family history of breast cancer: no  H/o STDs: trich and genital warts  Last pap:uncertain  Gardasil:completed  AUDI: family h/o prothrombin gene mutation, pt was tested and was ngative.    Health Maintenance   Topic Date Due   • TDAP/TD VACCINES (1 - Tdap) Never done   • ANNUAL PHYSICAL  2018   • Annual Gynecologic Pelvic and Breast Exam  2019   • COVID-19 Vaccine (2 - Pfizer 3-dose series) 03/10/2022   • PAP SMEAR  2023   • HEPATITIS C SCREENING  Completed   • INFLUENZA VACCINE  Completed   • Pneumococcal Vaccine 0-64  Aged Out       Past Medical History:   Diagnosis Date   • Abnormal Pap smear of cervix 2017- LGSIl, 2016- ASCUS + HPV, 2017 normal   • Acne    • History of genital warts    • HPV (human papilloma virus) infection     h/o genital warts   • Preeclampsia    • Urogenital trichomoniasis        Past Surgical History:   Procedure Laterality Date   • FINGER SURGERY     • INDUCED      • WISDOM  "TOOTH EXTRACTION           Current Outpatient Medications:   •  albuterol sulfate  (90 Base) MCG/ACT inhaler, Inhale 2 puffs., Disp: , Rfl:   •  cetirizine (zyrTEC) 5 MG tablet, Take 5 mg by mouth Daily., Disp: , Rfl:   •  drospirenone-ethinyl estradiol (RAVINDER,GIANVI) 3-0.02 MG per tablet, Take 1 tablet by mouth Daily., Disp: , Rfl:   •  Prenatal Vit-Fe Fumarate-FA (PRENATAL VITAMIN 27-0.8) 27-0.8 MG tablet tablet, Take  by mouth Daily., Disp: , Rfl:     Allergies   Allergen Reactions   • Doxycycline Anaphylaxis       Social History     Tobacco Use   • Smoking status: Never Smoker   • Smokeless tobacco: Never Used   Substance Use Topics   • Alcohol use: No   • Drug use: No       Family History   Problem Relation Age of Onset   • No Known Problems Father    • No Known Problems Mother    • Diabetes Maternal Grandmother    • Factor V Leiden deficiency Other    • Breast cancer Neg Hx    • Ovarian cancer Neg Hx    • Colon cancer Neg Hx        Review of Systems   Constitutional: Positive for activity change and fatigue. Negative for appetite change, fever and unexpected weight change.   Eyes: Negative for photophobia and visual disturbance.   Respiratory: Negative for cough and shortness of breath.    Cardiovascular: Negative for chest pain and palpitations.   Gastrointestinal: Negative for abdominal distention, abdominal pain, constipation, diarrhea and nausea.   Endocrine: Negative for cold intolerance and heat intolerance.   Genitourinary: Negative for dyspareunia, dysuria, menstrual problem, pelvic pain, vaginal bleeding and vaginal discharge.   Musculoskeletal: Negative for back pain.   Skin: Negative for color change and rash.   Neurological: Negative for headaches.   Hematological: Negative for adenopathy. Does not bruise/bleed easily.   Psychiatric/Behavioral: Negative for dysphoric mood. The patient is not nervous/anxious.        /72   Ht 154.9 cm (60.98\")   Wt 62.3 kg (137 lb 6.4 oz)   LMP " 2022 (Exact Date)   Breastfeeding No   BMI 25.98 kg/m²     Physical Exam  Vitals and nursing note reviewed.   Constitutional:       Appearance: Normal appearance. She is well-developed and normal weight.   HENT:      Head: Normocephalic and atraumatic.   Eyes:      General: No scleral icterus.     Conjunctiva/sclera: Conjunctivae normal.   Neck:      Thyroid: No thyromegaly.   Abdominal:      General: There is no distension.      Palpations: Abdomen is soft. There is no mass.      Tenderness: There is no abdominal tenderness. There is no guarding or rebound.      Hernia: No hernia is present.   Skin:     General: Skin is warm and dry.   Neurological:      Mental Status: She is alert and oriented to person, place, and time.   Psychiatric:         Mood and Affect: Mood normal.         Behavior: Behavior normal.         Thought Content: Thought content normal.         Judgment: Judgment normal.               Assessment/Plan  1) Missed menses- US confirms single IUP at 9.2 weeks, SUSAN= 10/11/2022.   2) Patient advised to have flu vaccination to help prevent serious flu related complications for her and her unborn child.  The importance of antibodies for  immunity also discussed with patient and she does agree to vaccination today.   All household contacts were advised to be vaccinated if they are of age. The patient was also encouraged to call for Tamiflu for positive exposures.   3) H/O severe preeclampsia in previous pregnancy- start ASA 81 mg in second trimester. Will need a baseline 24 hour urine.  4) H/O THC use- pt has stopped.   5) Family history of prothrombin gene mutation- pt has been tested and is negative  6)I saw the patient with a face mask, gloves and eye protection  The patient herself was masked.  Social distancing was observed as appropriate.  7) .RTO 1-2 weeks NOB and labs.        Diagnoses and all orders for this visit:    1. Amenorrhea (Primary)  -     POC Urinalysis Dipstick  -      POC Pregnancy, Urine    2. Needs flu shot    3. Hx of preeclampsia, prior pregnancy, currently pregnant    4. Tetrahydrocannabinol (THC) use disorder, mild, abuse    Other orders  -     FluLaval/Fluarix/Fluzone >6 Months ([id number])          Dasha Kirby MD  03/10/2022  17:28 EDT

## 2022-03-22 ENCOUNTER — INITIAL PRENATAL (OUTPATIENT)
Dept: OBSTETRICS AND GYNECOLOGY | Facility: CLINIC | Age: 32
End: 2022-03-22

## 2022-03-22 VITALS — WEIGHT: 138 LBS | DIASTOLIC BLOOD PRESSURE: 84 MMHG | BODY MASS INDEX: 26.09 KG/M2 | SYSTOLIC BLOOD PRESSURE: 112 MMHG

## 2022-03-22 DIAGNOSIS — Z87.59 H/O PRE-ECLAMPSIA: ICD-10-CM

## 2022-03-22 DIAGNOSIS — Z34.91 INITIAL OBSTETRIC VISIT IN FIRST TRIMESTER: Primary | ICD-10-CM

## 2022-03-22 DIAGNOSIS — Z11.51 ENCOUNTER FOR SCREENING FOR HUMAN PAPILLOMAVIRUS (HPV): ICD-10-CM

## 2022-03-22 DIAGNOSIS — Z36.9 ENCOUNTER FOR ANTENATAL SCREENING, UNSPECIFIED: ICD-10-CM

## 2022-03-22 DIAGNOSIS — Z01.419 PAP SMEAR, LOW-RISK: ICD-10-CM

## 2022-03-22 LAB
GLUCOSE UR STRIP-MCNC: NEGATIVE MG/DL
PROT UR STRIP-MCNC: NEGATIVE MG/DL

## 2022-03-22 PROCEDURE — 0501F PRENATAL FLOW SHEET: CPT | Performed by: NURSE PRACTITIONER

## 2022-03-22 NOTE — PROGRESS NOTES
Initial ob visit     Chief Complaint   Patient presents with   • Initial Prenatal Visit       Anup Guzman is being seen today for her first obstetrical visit.  She is a 31 y.o.  @ 11w0d gestation. This problem is new to me:  yes      # 1 - Date: None, Sex: None, Weight: None, GA: None, Delivery: None, Apgar1: None, Apgar5: None, Living: None, Birth Comments: None    # 2 - Date: None, Sex: None, Weight: None, GA: None, Delivery: None, Apgar1: None, Apgar5: None, Living: None, Birth Comments: None    # 3 - Date: None, Sex: None, Weight: None, GA: None, Delivery: None, Apgar1: None, Apgar5: None, Living: None, Birth Comments: None      LNMP: 22  Confident with date: Yes  Taking prenatal vitamins: Yes  Planned pregnancy: Yes  Prior obstetric issues, potential pregnancy concerns: h/o preeclampsia   Family history of genetic issues (includes FOB): denies   Varicella Hx: disease as child  Flu vaccine: S/P flu vaccine  COVID Vaccine: X 2 and booster  History of STDs: denies HSV  Current medications: PNV and baby ASA  Last pap smear:   Smoker: No  Drug or alcohol abuse: Used THC prior to pregnancy   H/O Physical, mental or sexual abuse: denies  H/O mental health disorder: denies  Prior testing for Cystic Fibrosis Carrier or Sickle Cell Trait- Thinks completed   Prepregnancy BMI: Body mass index is 26.09 kg/m².      Past Medical History:   Diagnosis Date   • Abnormal Pap smear of cervix 2017- LGSIl, 2016- ASCUS + HPV, 2017 normal   • Acne    • Anticoagulant disorder (HCC)     family history   • History of genital warts    • HPV (human papilloma virus) infection     h/o genital warts   • Urogenital trichomoniasis        Past Surgical History:   Procedure Laterality Date   • INDUCED            Current Outpatient Medications:   •  albuterol sulfate  (90 Base) MCG/ACT inhaler, Inhale 2 puffs., Disp: , Rfl:   •  cetirizine (zyrTEC) 5 MG tablet, Take 5 mg by mouth Daily., Disp: ,  Rfl:   •  drospirenone-ethinyl estradiol (RAVINDER,GIANVI) 3-0.02 MG per tablet, Take 1 tablet by mouth Daily., Disp: , Rfl:   •  Prenatal Vit-Fe Fumarate-FA (PRENATAL VITAMIN 27-0.8) 27-0.8 MG tablet tablet, Take  by mouth Daily., Disp: , Rfl:     Allergies   Allergen Reactions   • Doxycycline Anaphylaxis       Social History     Socioeconomic History   • Marital status: Single   Tobacco Use   • Smoking status: Never Smoker   • Smokeless tobacco: Never Used   Substance and Sexual Activity   • Alcohol use: No   • Drug use: No   • Sexual activity: Yes     Partners: Male       Family History   Problem Relation Age of Onset   • No Known Problems Father    • No Known Problems Mother    • Diabetes Maternal Grandmother    • Factor V Leiden deficiency Other    • Breast cancer Neg Hx    • Ovarian cancer Neg Hx    • Colon cancer Neg Hx        Review of systems     All other systems reviewed and are negative except for: Genitourinary: positive for missed menses     Objective    /84   Wt 62.6 kg (138 lb)   LMP 01/04/2022 (Exact Date)   BMI 26.09 kg/m²       General Appearance:    Alert, cooperative, in no acute distress, habitus normal    Head:    Not examined   Eyes:           Not examined   Ears:  Not examined       Neck:  No thyroid enlargement or nodules present   Back:     No kyphosis present, no scoliosis present,                       Lungs:     Clear to auscultation,respirations regular, even and                   unlabored    Heart:    Regular rhythm and normal rate, normal S1 and S2, no            murmur, no gallop, no rub, no click   Breast Exam:    No masses, No nipple discharge   Abdomen:     Normal bowel sounds, no masses, no organomegaly, soft        non-tender, non-distended, no guarding, no rebound                 tenderness   Genitalia:    Vulva - No masses, no atrophy, no lesions    Vagina - No discharge, No bleeding    Cervix - No Lesions, closed. Pap collected:Yes     Uterus - Consistent with 11 weeks.      Adnexa - No masses, non tender       Extremities:   Moves all extremities well, no edema, no cyanosis, no              redness       Skin:   No bleeding, bruising or rash       Neurologic:   Sensation intact, A&O times 3      Assessment/Plan    1) Pregnancy at 11w0d- FHTs + via doppler today.     2) OB exam: OB exam completed: Yes. New OB bag provided Yes. Pap collected: Yes.    3) Labs: OB labs collected: Yes Counseled on genetic screening: Yes, she declines CF, SMA, and FX. Counseled on Quad screen and AFP: No, she is too early for AFP. Counseled on NIPS: Yes, she desires NIPS.      4) Patient's Body mass index is 26.09 kg/m². indicating that she is overweight (BMI 25-29.9). Overweight women, with a BMI of 25-29, should gain approx 15-25 pounds for the entire pregnancy.     5)  Prenatal care: Oriented to the office and prenatal care. Encourage prenatal vitamins. Disc Tylenol products are fine, avoid aspirin and ibuprofen; Zika (travel restrictions/ok to use insect repellant); not to change cat litter; food restrictions; exercise;  avoidance of alcohol, tobacco, drugs and saunas/hot tubs.     6) COVID19 precautions were reviewed with the patient. Continue to encourage social distancing, wearing a mask, and good hand hygiene.  I wore a mask, protective eye wear, and gloves during this patient encounter.  Patient also wearing a surgical mask and social distancing was observed. Hand hygeine performed before and after seeing the patient. Info provided on Covid vaccine: S/p vaccine and booster    7) H/O preeclampsia- Taking a baby ASA. Check baseline 24 hr urine and CMP.     8) + THC use- Reports stopped use with pregnancy. Enc no use in pregnancy.     9) S/P Flu vaccine     All questions answered.     RTO 4 weeks     SHELLEY Sheridan  3/22/2022  16:17 EDT

## 2022-03-23 LAB
ABO GROUP BLD: NORMAL
ALBUMIN SERPL-MCNC: 4.6 G/DL (ref 3.8–4.8)
ALBUMIN/GLOB SERPL: 1.8 {RATIO} (ref 1.2–2.2)
ALP SERPL-CCNC: 53 IU/L (ref 44–121)
ALT SERPL-CCNC: 19 IU/L (ref 0–32)
AST SERPL-CCNC: 20 IU/L (ref 0–40)
BASOPHILS # BLD AUTO: 0 X10E3/UL (ref 0–0.2)
BASOPHILS NFR BLD AUTO: 0 %
BILIRUB SERPL-MCNC: 0.3 MG/DL (ref 0–1.2)
BLD GP AB SCN SERPL QL: NEGATIVE
BUN SERPL-MCNC: 7 MG/DL (ref 6–20)
BUN/CREAT SERPL: 11 (ref 9–23)
C TRACH RRNA SPEC QL NAA+PROBE: NEGATIVE
CALCIUM SERPL-MCNC: 9.3 MG/DL (ref 8.7–10.2)
CHLORIDE SERPL-SCNC: 101 MMOL/L (ref 96–106)
CO2 SERPL-SCNC: 21 MMOL/L (ref 20–29)
CREAT SERPL-MCNC: 0.63 MG/DL (ref 0.57–1)
EGFRCR SERPLBLD CKD-EPI 2021: 121 ML/MIN/1.73
EOSINOPHIL # BLD AUTO: 0.1 X10E3/UL (ref 0–0.4)
EOSINOPHIL NFR BLD AUTO: 1 %
ERYTHROCYTE [DISTWIDTH] IN BLOOD BY AUTOMATED COUNT: 13.2 % (ref 11.7–15.4)
GLOBULIN SER CALC-MCNC: 2.5 G/DL (ref 1.5–4.5)
GLUCOSE SERPL-MCNC: 79 MG/DL (ref 65–99)
HBA1C MFR BLD: 5.1 % (ref 4.8–5.6)
HBV SURFACE AG SERPL QL IA: NEGATIVE
HCT VFR BLD AUTO: 39.1 % (ref 34–46.6)
HCV AB S/CO SERPL IA: 0.1 S/CO RATIO (ref 0–0.9)
HGB BLD-MCNC: 13.5 G/DL (ref 11.1–15.9)
HIV 1+2 AB+HIV1 P24 AG SERPL QL IA: NON REACTIVE
IMM GRANULOCYTES # BLD AUTO: 0 X10E3/UL (ref 0–0.1)
IMM GRANULOCYTES NFR BLD AUTO: 0 %
LYMPHOCYTES # BLD AUTO: 2.3 X10E3/UL (ref 0.7–3.1)
LYMPHOCYTES NFR BLD AUTO: 36 %
MCH RBC QN AUTO: 29.3 PG (ref 26.6–33)
MCHC RBC AUTO-ENTMCNC: 34.5 G/DL (ref 31.5–35.7)
MCV RBC AUTO: 85 FL (ref 79–97)
MONOCYTES # BLD AUTO: 0.4 X10E3/UL (ref 0.1–0.9)
MONOCYTES NFR BLD AUTO: 7 %
N GONORRHOEA RRNA SPEC QL NAA+PROBE: NEGATIVE
NEUTROPHILS # BLD AUTO: 3.6 X10E3/UL (ref 1.4–7)
NEUTROPHILS NFR BLD AUTO: 56 %
PLATELET # BLD AUTO: 191 X10E3/UL (ref 150–450)
POTASSIUM SERPL-SCNC: 3.6 MMOL/L (ref 3.5–5.2)
PROT SERPL-MCNC: 7.1 G/DL (ref 6–8.5)
RBC # BLD AUTO: 4.6 X10E6/UL (ref 3.77–5.28)
RH BLD: POSITIVE
RPR SER QL: NON REACTIVE
RUBV IGG SERPL IA-ACNC: 1.15 INDEX
SODIUM SERPL-SCNC: 138 MMOL/L (ref 134–144)
T VAGINALIS RRNA SPEC QL NAA+PROBE: NEGATIVE
VZV IGG SER IA-ACNC: 1512 INDEX
WBC # BLD AUTO: 6.4 X10E3/UL (ref 3.4–10.8)

## 2022-03-24 LAB
AMPHETAMINES UR QL SCN: NEGATIVE NG/ML
BACTERIA UR CULT: NO GROWTH
BACTERIA UR CULT: NORMAL
BARBITURATES UR QL SCN: NEGATIVE NG/ML
BENZODIAZ UR QL SCN: NEGATIVE NG/ML
BZE UR QL SCN: NEGATIVE NG/ML
CANNABINOIDS UR QL SCN: NEGATIVE NG/ML
CREAT UR-MCNC: 14.2 MG/DL (ref 20–300)
LABORATORY COMMENT REPORT: ABNORMAL
METHADONE UR QL SCN: NEGATIVE NG/ML
OPIATES UR QL SCN: NEGATIVE NG/ML
OXYCODONE+OXYMORPHONE UR QL SCN: NEGATIVE NG/ML
PCP UR QL: NEGATIVE NG/ML
PH UR: 7.2 [PH] (ref 4.5–8.9)
PROPOXYPH UR QL SCN: NEGATIVE NG/ML
SP GR UR: 1

## 2022-03-28 LAB
CYTOLOGIST CVX/VAG CYTO: NORMAL
CYTOLOGY CVX/VAG DOC CYTO: NORMAL
CYTOLOGY CVX/VAG DOC THIN PREP: NORMAL
DX ICD CODE: NORMAL
HIV 1 & 2 AB SER-IMP: NORMAL
HPV I/H RISK 4 DNA CVX QL PROBE+SIG AMP: NEGATIVE
OTHER STN SPEC: NORMAL
STAT OF ADQ CVX/VAG CYTO-IMP: NORMAL

## 2022-03-30 PROBLEM — Z87.59 H/O PRE-ECLAMPSIA: Status: ACTIVE | Noted: 2022-03-30

## 2022-03-30 PROBLEM — Z34.91 INITIAL OBSTETRIC VISIT IN FIRST TRIMESTER: Status: ACTIVE | Noted: 2022-03-30

## 2022-04-04 ENCOUNTER — LAB (OUTPATIENT)
Dept: OBSTETRICS AND GYNECOLOGY | Facility: CLINIC | Age: 32
End: 2022-04-04

## 2022-04-04 DIAGNOSIS — R03.0 ELEVATED BLOOD PRESSURE READING IN OFFICE WITHOUT DIAGNOSIS OF HYPERTENSION: Primary | ICD-10-CM

## 2022-04-05 LAB
PROT 24H UR-MRATE: 108 MG/24 HR (ref 30–150)
PROT UR-MCNC: 4.3 MG/DL

## 2022-04-22 LAB — EXTERNAL NIPT: NORMAL

## 2022-04-25 ENCOUNTER — ROUTINE PRENATAL (OUTPATIENT)
Dept: OBSTETRICS AND GYNECOLOGY | Facility: CLINIC | Age: 32
End: 2022-04-25

## 2022-04-25 VITALS — DIASTOLIC BLOOD PRESSURE: 70 MMHG | SYSTOLIC BLOOD PRESSURE: 122 MMHG | WEIGHT: 145 LBS | BODY MASS INDEX: 27.41 KG/M2

## 2022-04-25 DIAGNOSIS — Z36.9 ENCOUNTER FOR ANTENATAL SCREENING, UNSPECIFIED: Primary | ICD-10-CM

## 2022-04-25 DIAGNOSIS — Z87.59 H/O PRE-ECLAMPSIA: ICD-10-CM

## 2022-04-25 DIAGNOSIS — Z83.2 FAMILY HISTORY OF BLOOD COAGULATION DISORDER: ICD-10-CM

## 2022-04-25 DIAGNOSIS — Z87.51 HISTORY OF PRETERM DELIVERY: ICD-10-CM

## 2022-04-25 DIAGNOSIS — Z34.90 PREGNANCY, UNSPECIFIED GESTATIONAL AGE: ICD-10-CM

## 2022-04-25 DIAGNOSIS — Z86.19 HISTORY OF GENITAL WARTS: ICD-10-CM

## 2022-04-25 PROBLEM — Z34.91 INITIAL OBSTETRIC VISIT IN FIRST TRIMESTER: Status: RESOLVED | Noted: 2022-03-30 | Resolved: 2022-04-25

## 2022-04-25 PROBLEM — R03.0 ELEVATED BLOOD PRESSURE READING IN OFFICE WITHOUT DIAGNOSIS OF HYPERTENSION: Status: RESOLVED | Noted: 2019-12-18 | Resolved: 2022-04-25

## 2022-04-25 LAB
EXTERNAL CYSTIC FIBROSIS: NORMAL
GLUCOSE UR STRIP-MCNC: NEGATIVE MG/DL
PROT UR STRIP-MCNC: NEGATIVE MG/DL

## 2022-04-25 PROCEDURE — 0502F SUBSEQUENT PRENATAL CARE: CPT | Performed by: OBSTETRICS & GYNECOLOGY

## 2022-04-25 NOTE — PROGRESS NOTES
OB follow up     Anup Guzman is a 31 y.o.  15w6d being seen today for her obstetrical visit.  Patient reports no complaints. Fetal movement: not yet. She is taking ASA 81 mg. She is not interested in CF/SMA/FX    Review of Systems  No bleeding, No cramping/contractions     /70   Wt 65.8 kg (145 lb)   LMP 2022 (Exact Date)   BMI 27.41 kg/m²     FHT: 136 BPM   Uterine Size: 16  cm       Assessment/Plan:    1) 31 y.o.  -pregnancy at 15w6d- NIPT low risk, check AFP today.    2) Declines CF/SMA/FX/ECS    3) H/O IOL at 34.5 weeks for severe preeclampsia- on ASA 81 mg. 24 hour urine= 108 mg (2022). Will need growth US q 4 weeks starting > 28 weeks    4) S/P flu and C19 vaccines    5) Family history of prothrombin gene mutation- pt was tested and was negative    6) I saw the patient with a face mask, gloves and eye protection  The patient herself was masked.  Social distancing was observed as appropriate.    7)Reviewed this stage of pregnancy    8)Problem list updated     9) RTO 4 weeks OBT and US anatomy.       Dasha Kirby MD    2022  15:46 EDT

## 2022-04-26 LAB
AFP INTERP SERPL-IMP: NORMAL
AFP INTERP SERPL-IMP: NORMAL
AFP MOM SERPL: 0.97
AFP SERPL-MCNC: 33.9 NG/ML
AGE AT DELIVERY: 33.2 YR
GA METHOD: NORMAL
GA: 15.9 WEEKS
IDDM PATIENT QL: NO
LABORATORY COMMENT REPORT: NORMAL
MULTIPLE PREGNANCY: NO
NEURAL TUBE DEFECT RISK FETUS: NORMAL %
RESULT: NORMAL

## 2022-05-25 ENCOUNTER — ROUTINE PRENATAL (OUTPATIENT)
Dept: OBSTETRICS AND GYNECOLOGY | Facility: CLINIC | Age: 32
End: 2022-05-25

## 2022-05-25 VITALS — WEIGHT: 147 LBS | SYSTOLIC BLOOD PRESSURE: 110 MMHG | BODY MASS INDEX: 27.79 KG/M2 | DIASTOLIC BLOOD PRESSURE: 60 MMHG

## 2022-05-25 DIAGNOSIS — Z87.59 H/O PRE-ECLAMPSIA: ICD-10-CM

## 2022-05-25 DIAGNOSIS — Z87.51 HISTORY OF PRETERM DELIVERY: ICD-10-CM

## 2022-05-25 DIAGNOSIS — Z3A.20 20 WEEKS GESTATION OF PREGNANCY: Primary | ICD-10-CM

## 2022-05-25 LAB
GLUCOSE UR STRIP-MCNC: NEGATIVE MG/DL
PROT UR STRIP-MCNC: NEGATIVE MG/DL

## 2022-05-25 PROCEDURE — 0502F SUBSEQUENT PRENATAL CARE: CPT | Performed by: OBSTETRICS & GYNECOLOGY

## 2022-05-26 NOTE — PROGRESS NOTES
OB follow up     Anup Guzman is a 31 y.o.  20w2d being seen today for her obstetrical visit.  Patient reports no bleeding, no contractions and no leaking. Fetal movement: normal.  Patient is here for anatomical survey.    Review of Systems  No bleeding, No cramping/contractions     /60   Wt 66.7 kg (147 lb)   LMP 2022 (Exact Date)   BMI 27.79 kg/m²     FHT: present BPM   Uterine Size: 20 cm   Live viable lynn fetus seen.  Normal anatomical survey.  Good cardiac activity of movement.  LUIS is normal.    Assessment/Plan:    1) 31 y.o.  -pregnancy at 20w2d    2)   Encounter Diagnoses   Name Primary?   • 20 weeks gestation of pregnancy Yes   • History of  delivery- IOL at 34.5 for severe preeclampsia    • H/O pre-eclampsia, 24 hour urine= 108 mg ( 2022), on ASA 81 mg    History of preeclampsia on aspirin daily.  Normal anatomical survey.  No evidence of preeclampsia today.  Follow-up in 4 weeks.    3) Reviewed this stage of pregnancy  4) Problem list updated     Return in about 4 weeks (around 2022) for OB [place].      Micky Horn MD    2022  13:10 EDT

## 2022-06-22 ENCOUNTER — ROUTINE PRENATAL (OUTPATIENT)
Dept: OBSTETRICS AND GYNECOLOGY | Facility: CLINIC | Age: 32
End: 2022-06-22

## 2022-06-22 VITALS — SYSTOLIC BLOOD PRESSURE: 112 MMHG | DIASTOLIC BLOOD PRESSURE: 78 MMHG | WEIGHT: 153 LBS | BODY MASS INDEX: 28.92 KG/M2

## 2022-06-22 DIAGNOSIS — Z3A.24 24 WEEKS GESTATION OF PREGNANCY: Primary | ICD-10-CM

## 2022-06-22 DIAGNOSIS — Z34.92 PRENATAL CARE IN SECOND TRIMESTER: ICD-10-CM

## 2022-06-22 DIAGNOSIS — Z87.51 HISTORY OF PRETERM DELIVERY: ICD-10-CM

## 2022-06-22 LAB
GLUCOSE UR STRIP-MCNC: NEGATIVE MG/DL
PROT UR STRIP-MCNC: NEGATIVE MG/DL

## 2022-06-22 PROCEDURE — 0502F SUBSEQUENT PRENATAL CARE: CPT | Performed by: NURSE PRACTITIONER

## 2022-06-22 NOTE — PROGRESS NOTES
OB follow up     Anup Guzman is a 31 y.o.   24w1d being seen today for her obstetrical visit.  Patient reports no complaints. Fetal movement: not yet. She is taking ASA 81 mg.     Review of Systems  No bleeding, No cramping/contractions     /78   Wt 69.4 kg (153 lb)   LMP 2022 (Exact Date)   BMI 28.92 kg/m²     FHT: 140s  BPM   Uterine Size: 25 cm       Assessment/Plan:    1) 31 y.o.  -pregnancy at 24w1d-     2) Declines CF/SMA/FX/ECS    3) H/O IOL at 34.5 weeks for severe preeclampsia- on ASA 81 mg. 24 hour urine= 108 mg (2022). Will need growth US q 4 weeks starting > 28 weeks    4) S/P flu and C19 vaccines    5) Family history of prothrombin gene mutation- pt was tested and was negative    6) I saw the patient with a face mask, gloves and eye protection  The patient herself was masked.  Social distancing was observed as appropriate.    7)Reviewed this stage of pregnancy    8)Problem list updated     RTO 4 weeks OBT, 2hr GTT and US- Growth        SHELLEY Sheridan  2022  11:37 EDT

## 2022-07-21 ENCOUNTER — ROUTINE PRENATAL (OUTPATIENT)
Dept: OBSTETRICS AND GYNECOLOGY | Facility: CLINIC | Age: 32
End: 2022-07-21

## 2022-07-21 VITALS — DIASTOLIC BLOOD PRESSURE: 88 MMHG | WEIGHT: 159 LBS | BODY MASS INDEX: 30.06 KG/M2 | SYSTOLIC BLOOD PRESSURE: 120 MMHG

## 2022-07-21 DIAGNOSIS — O26.843 FUNDAL HEIGHT LOW FOR DATES IN THIRD TRIMESTER: ICD-10-CM

## 2022-07-21 DIAGNOSIS — Z87.51 HISTORY OF PRETERM DELIVERY: ICD-10-CM

## 2022-07-21 DIAGNOSIS — Z87.59 H/O PRE-ECLAMPSIA: Primary | ICD-10-CM

## 2022-07-21 DIAGNOSIS — Z23 NEED FOR TDAP VACCINATION: ICD-10-CM

## 2022-07-21 DIAGNOSIS — Z34.90 PREGNANCY, UNSPECIFIED GESTATIONAL AGE: ICD-10-CM

## 2022-07-21 DIAGNOSIS — Z83.2 FAMILY HISTORY OF BLOOD COAGULATION DISORDER: ICD-10-CM

## 2022-07-21 PROBLEM — O26.849 FUNDAL HEIGHT LOW FOR DATES: Status: ACTIVE | Noted: 2022-07-21

## 2022-07-21 LAB
GLUCOSE UR STRIP-MCNC: NEGATIVE MG/DL
PROT UR STRIP-MCNC: NEGATIVE MG/DL

## 2022-07-21 PROCEDURE — 0502F SUBSEQUENT PRENATAL CARE: CPT | Performed by: OBSTETRICS & GYNECOLOGY

## 2022-07-21 PROCEDURE — 90471 IMMUNIZATION ADMIN: CPT | Performed by: OBSTETRICS & GYNECOLOGY

## 2022-07-21 PROCEDURE — 90715 TDAP VACCINE 7 YRS/> IM: CPT | Performed by: OBSTETRICS & GYNECOLOGY

## 2022-07-21 NOTE — PROGRESS NOTES
OB follow up     Anup Guzman is a 31 y.o.  28w2d being seen today for her obstetrical visit.  Patient reports no complaints. Fetal movement: normal. She is on ASA 81 mg daily. She is agreeable to Tdap vaccine    Review of Systems  No bleeding, No cramping/contractions     /88   Wt 72.1 kg (159 lb)   LMP 2022 (Exact Date)   BMI 30.06 kg/m²     FHT: 136 BPM   Uterine Size: 26  cm       Assessment/Plan:    1) 31 y.o.  -pregnancy at 28w2d- 2 hour GTT in progress. RH+    2) Declines CF/SMA/FX/ECS     3) H/O IOL at 34.5 weeks for severe preeclampsia- on ASA 81 mg. 24 hour urine= 108 mg (2022). Will need growth US q 4 weeks starting > 28 weeks     4) S/P flu and C19 vaccines     5) Family history of prothrombin gene mutation- pt was tested and was negative     6) S<D- US today shows growth 51% ( 2.11#) , LUIS= 13 cm, , breech, US compared to last scan on 2022.     7) Patient advised to have the Tdap shot in the later part of pregnancy to help protect against whooping cough.  Also advised that FOB and other adults who come in contact with the infant should also be vaccinated with Tdap.  Vaccine given today    8) I saw the patient with a face mask, gloves and eye protection  The patient herself was masked.  Social distancing was observed as appropriate.     9) Reviewed this stage of pregnancy    10)Problem list updated     11) RTO 2 weeks OBT and 4 weeks OBT and US growth ( h/o preeclampsia, S<D)       Dasha Kirby MD    2022  09:43 EDT

## 2022-07-29 PROBLEM — O99.019 ANTEPARTUM ANEMIA: Status: ACTIVE | Noted: 2022-07-29

## 2022-07-29 RX ORDER — DOXYCYCLINE HYCLATE 50 MG/1
324 CAPSULE, GELATIN COATED ORAL
Qty: 90 TABLET | Refills: 1 | Status: SHIPPED | OUTPATIENT
Start: 2022-07-29 | End: 2022-09-22 | Stop reason: HOSPADM

## 2022-08-03 ENCOUNTER — ROUTINE PRENATAL (OUTPATIENT)
Dept: OBSTETRICS AND GYNECOLOGY | Facility: CLINIC | Age: 32
End: 2022-08-03

## 2022-08-03 VITALS — DIASTOLIC BLOOD PRESSURE: 60 MMHG | SYSTOLIC BLOOD PRESSURE: 118 MMHG | WEIGHT: 161.8 LBS | BODY MASS INDEX: 30.59 KG/M2

## 2022-08-03 DIAGNOSIS — Z87.59 H/O PRE-ECLAMPSIA: ICD-10-CM

## 2022-08-03 DIAGNOSIS — Z87.51 HISTORY OF PRETERM DELIVERY: Primary | ICD-10-CM

## 2022-08-03 LAB
GLUCOSE UR STRIP-MCNC: NEGATIVE MG/DL
PROT UR STRIP-MCNC: NEGATIVE MG/DL

## 2022-08-03 PROCEDURE — 0502F SUBSEQUENT PRENATAL CARE: CPT | Performed by: OBSTETRICS & GYNECOLOGY

## 2022-08-03 NOTE — PROGRESS NOTES
Pt here for ob check.  32 y.o. @30w1d.   I saw the patient with a face mask, gloves and eye protection  The patient herself was masked.  Social distancing was observed as appropriate. All COVID precautions observed.   Pt having some L flank pain.  Denies dysuria.   Exam neg. No CVAT.  prob SI joint origin.  Instructions and precautions given.

## 2022-08-19 ENCOUNTER — ROUTINE PRENATAL (OUTPATIENT)
Dept: OBSTETRICS AND GYNECOLOGY | Facility: CLINIC | Age: 32
End: 2022-08-19

## 2022-08-19 VITALS — BODY MASS INDEX: 31.38 KG/M2 | SYSTOLIC BLOOD PRESSURE: 132 MMHG | WEIGHT: 166 LBS | DIASTOLIC BLOOD PRESSURE: 70 MMHG

## 2022-08-19 DIAGNOSIS — O26.843 FUNDAL HEIGHT LOW FOR DATES IN THIRD TRIMESTER: ICD-10-CM

## 2022-08-19 DIAGNOSIS — O99.019 ANTEPARTUM ANEMIA: ICD-10-CM

## 2022-08-19 DIAGNOSIS — Z3A.32 32 WEEKS GESTATION OF PREGNANCY: Primary | ICD-10-CM

## 2022-08-19 LAB
GLUCOSE UR STRIP-MCNC: NEGATIVE MG/DL
PROT UR STRIP-MCNC: NEGATIVE MG/DL

## 2022-08-19 PROCEDURE — 0502F SUBSEQUENT PRENATAL CARE: CPT | Performed by: OBSTETRICS & GYNECOLOGY

## 2022-08-19 NOTE — PROGRESS NOTES
OB follow up     Anup Guzman is a 32 y.o.  32w3d being seen today for her obstetrical visit.  Patient reports no bleeding, no contractions and no leaking. Fetal movement: normal.  Patient is here for growth scan.  Prenatal care is complicated by history of a  delivery because of severe preeclampsia.  In addition she has been followed for size less than dates.  She has a history of anemia and is on iron.  Last hemoglobin was 11.4.    Review of Systems  No bleeding, No cramping/contractions     /70   Wt 75.3 kg (166 lb)   LMP 2022 (Exact Date)   BMI 31.38 kg/m²     FHT: present BPM   Uterine Size:     Live viable lynn pregnancy in the vertex position.  Placenta is posterior and fundal grade 2.  Estimated fetal weight is 48th percentile.  LUIS is 16.7 cm.  Normal interval growth noted.    Assessment/Plan:    1) 32 y.o.  -pregnancy at 32w3d    2)   Encounter Diagnoses   Name Primary?   • 32 weeks gestation of pregnancy Yes   • Fundal height low for dates in third trimester    • Antepartum anemia    Normal interval growth.  Continue iron daily.  Cephalic position now.  No evidence of preeclampsia.    3) Reviewed this stage of pregnancy  4) Problem list updated     Return in about 2 weeks (around 2022) for OB Tummy.      Micky Horn MD    2022  10:25 EDT

## 2022-08-26 ENCOUNTER — TELEPHONE (OUTPATIENT)
Dept: OBSTETRICS AND GYNECOLOGY | Facility: CLINIC | Age: 32
End: 2022-08-26

## 2022-08-26 ENCOUNTER — HOSPITAL ENCOUNTER (OUTPATIENT)
Dept: ULTRASOUND IMAGING | Facility: HOSPITAL | Age: 32
Discharge: HOME | End: 2022-08-26
Admitting: OBSTETRICS & GYNECOLOGY

## 2022-08-26 DIAGNOSIS — R60.0 LOWER EXTREMITY EDEMA: Primary | ICD-10-CM

## 2022-08-26 DIAGNOSIS — R60.0 LOWER EXTREMITY EDEMA: ICD-10-CM

## 2022-08-26 PROCEDURE — 93970 EXTREMITY STUDY: CPT

## 2022-08-26 NOTE — TELEPHONE ENCOUNTER
Provider: DR RICHARDSON  Caller: RICKI DONNELLY  Relationship to Patient: SELF  Pharmacy: [facility] IN [place]  Phone Number: [phone]  CALL ANYTIME AND LVM    Reason for Call:     PT IS EXPERIENCING SWELLING IN HER LEFT LOWER LEG, ANKLE, AND FOOT FOR THE PAST THREE DAYS    IT HAS GOTTEN WORSE OVER THE PAST DAY    SHE IS 33 WKS PREGNANT AND HAS A HISTORY OF PREECLAMPSIA    SHE IS ALREADY SWOLLEN THIS MORNING AND SHE WOKE UP AT 6AM WENT TO WORK AND IT IS SO SWOLLEN HER SHOE DOESN'T FIT    PT IS CONCERNED AND WANTS TO DISCUSS WHETHER IT IS NORMAL OR WHAT SHE SHOULD DO.

## 2022-09-08 ENCOUNTER — ROUTINE PRENATAL (OUTPATIENT)
Dept: OBSTETRICS AND GYNECOLOGY | Facility: CLINIC | Age: 32
End: 2022-09-08

## 2022-09-08 VITALS — BODY MASS INDEX: 33.01 KG/M2 | DIASTOLIC BLOOD PRESSURE: 88 MMHG | SYSTOLIC BLOOD PRESSURE: 136 MMHG | WEIGHT: 174.6 LBS

## 2022-09-08 DIAGNOSIS — Z87.51 HISTORY OF PRETERM DELIVERY: ICD-10-CM

## 2022-09-08 DIAGNOSIS — Z86.19 HISTORY OF GENITAL WARTS: ICD-10-CM

## 2022-09-08 DIAGNOSIS — Z36.85 ENCOUNTER FOR ANTENATAL SCREENING FOR STREPTOCOCCUS B: Primary | ICD-10-CM

## 2022-09-08 DIAGNOSIS — O99.019 ANTEPARTUM ANEMIA: ICD-10-CM

## 2022-09-08 DIAGNOSIS — Z87.59 H/O PRE-ECLAMPSIA: ICD-10-CM

## 2022-09-08 LAB
GLUCOSE UR STRIP-MCNC: NEGATIVE MG/DL
PROT UR STRIP-MCNC: NEGATIVE MG/DL

## 2022-09-08 PROCEDURE — 0502F SUBSEQUENT PRENATAL CARE: CPT | Performed by: STUDENT IN AN ORGANIZED HEALTH CARE EDUCATION/TRAINING PROGRAM

## 2022-09-08 RX ORDER — VALACYCLOVIR HYDROCHLORIDE 500 MG/1
500 TABLET, FILM COATED ORAL 2 TIMES DAILY
Qty: 60 TABLET | Refills: 1 | Status: SHIPPED | OUTPATIENT
Start: 2022-09-08 | End: 2022-09-22 | Stop reason: HOSPADM

## 2022-09-08 NOTE — PROGRESS NOTES
32 y.o.    35w2d  weeks presents for a TYESHA appointment for a desired pregnancy.  She denies bleeding, cramping and abnormal vaginal discharge. This is a desired pregnancy    EDC:   [id number]          by [  X ] LMP  [   ] US       G  3     P   0111           1. Hx Of Pre-e SF   Baseline labs WNL   ASA daily   B/P checks starting 35wga     2. Hx of PTL   2/2 Pre-e SF           3. Hx of HSV?   Denies prodrome ; HSV prophylaxis started 35wga                    4.  Anemia   Rx Fe+ taking            5.                                      Meds:    Current Outpatient Medications:   •  aspirin 81 MG oral suspension, , Disp: , Rfl:   •  ferrous gluconate (FERGON) 324 MG tablet, Take 1 tablet by mouth Daily With Breakfast., Disp: 90 tablet, Rfl: 1  •  Prenatal Vit-Fe Fumarate-FA (PRENATAL VITAMIN 27-0.8) 27-0.8 MG tablet tablet, Take  by mouth Daily., Disp: , Rfl:   •  valACYclovir (Valtrex) 500 MG tablet, Take 1 tablet by mouth 2 (Two) Times a Day., Disp: 60 tablet, Rfl: 1           PN Labs [  X ] Normal   [   ] Rh positive  [   ] Rh negative   [   ] RhoGam 28 wks             Ultrasounds:        1st trimester date:    [id number]    Wks:       , consistent with dates       2nd US:  date:     EGA:      [ X  ] CWD    Placenta: Fundal     Anatomy:  WNL            Postpartum contraception:    CHC            Infant feeding plan: Breastfeeding                                              Date:              Feeling fine.   Periods are regular.        Active Ambulatory Problems     Diagnosis Date Noted   • History of genital warts ( PRIVATE)    • Family history of blood coagulation disorder ( prothrombin gene mutation, pt tested negative) 2020   • H/O pre-eclampsia, 24 hour urine= 108 mg ( 2022), on ASA 81 mg 2022   • Pregnancy 2022   • History of  delivery- IOL at 34.5 for severe preeclampsia 2022   • Need for Tdap vaccination 2022   • Fundal height low for dates 2022   • Antepartum  anemia 2022     Resolved Ambulatory Problems     Diagnosis Date Noted   • Cerumen impaction 2016   • CD (contact dermatitis) 2016   • Blues 2016   • Fatigue 2016   • Congested 2016   • Abnormal presence of protein in urine 2016   • Acne vulgaris 2016   • Onychia of toe of left foot 2016   • Abnormal Pap smear of cervix 2017   • Use teratogenic medication in female of reproductive age 2018   • Elevated blood pressure reading in office without diagnosis of hypertension 2019   • Initial obstetric visit in first trimester 2022     Past Medical History:   Diagnosis Date   • Acne    • HPV (human papilloma virus) infection    • Preeclampsia    • Urogenital trichomoniasis                  Past Surgical History:   Procedure Laterality Date   • FINGER SURGERY     • INDUCED      • WISDOM TOOTH EXTRACTION             POB hx:  ; pre e SF   PGYN hx:    Social hx:   Social History     Socioeconomic History   • Marital status: Single   Tobacco Use   • Smoking status: Never Smoker   • Smokeless tobacco: Never Used   Substance and Sexual Activity   • Alcohol use: No   • Drug use: No   • Sexual activity: Yes     Partners: Male     Birth control/protection: None        [  X ] no h/o abuse/DV:         [ X  ] No ETOH, tobacco, drugs  [   ] Tobacco use   [   ] Alcohol use  [   ] Drug use   Marital status:           [  X ] FOB involved     Significant family hx:   Family History   Problem Relation Age of Onset   • No Known Problems Father    • No Known Problems Mother    • Diabetes Maternal Grandmother    • Factor V Leiden deficiency Other    • Breast cancer Neg Hx    • Ovarian cancer Neg Hx    • Colon cancer Neg Hx                     [ X  ] No birth defects, stillbirth           Allergies   Allergen Reactions   • Doxycycline Anaphylaxis                 Meds: See above     A/P   32 y.o.    35w2d  Doing well today. Was seen for  bilateral swelling and US WNL. Today had GBS swab, started HSV prophylaxis. Discussed s/s of Pre-e SF. Recommend IOL if makes it to 39wga. Will see next week for B/P check and presentation. All questions answered           1. Hx Of Pre-e SF   Baseline labs WNL   ASA daily   B/P checks starting 35wga ; Concern for Pre-e ; Pressures 130/80 bt creeping up     2. Hx of PTL   2/2 Pre-e SF           3. Hx of HSV?   Denies prodrome ; HSV prophylaxis started 35wga                    4.  Anemia   Rx Fe+ taking

## 2022-09-10 LAB — GP B STREP DNA SPEC QL NAA+PROBE: POSITIVE

## 2022-09-12 ENCOUNTER — HOSPITAL ENCOUNTER (OUTPATIENT)
Facility: HOSPITAL | Age: 32
Discharge: HOME | End: 2022-09-12
Attending: OBSTETRICS & GYNECOLOGY | Admitting: OBSTETRICS & GYNECOLOGY

## 2022-09-12 ENCOUNTER — CLINICAL SUPPORT (OUTPATIENT)
Dept: OBSTETRICS AND GYNECOLOGY | Facility: CLINIC | Age: 32
End: 2022-09-12

## 2022-09-12 VITALS — SYSTOLIC BLOOD PRESSURE: 160 MMHG | DIASTOLIC BLOOD PRESSURE: 102 MMHG

## 2022-09-12 VITALS
SYSTOLIC BLOOD PRESSURE: 152 MMHG | HEART RATE: 85 BPM | HEIGHT: 62 IN | BODY MASS INDEX: 32.02 KG/M2 | TEMPERATURE: 98.6 F | DIASTOLIC BLOOD PRESSURE: 91 MMHG | RESPIRATION RATE: 20 BRPM | WEIGHT: 174 LBS

## 2022-09-12 DIAGNOSIS — O13.3 PREGNANCY-INDUCED HYPERTENSION, THIRD TRIMESTER: ICD-10-CM

## 2022-09-12 DIAGNOSIS — Z3A.35 35 WEEKS GESTATION OF PREGNANCY: Primary | ICD-10-CM

## 2022-09-12 DIAGNOSIS — Z87.59 H/O PRE-ECLAMPSIA: ICD-10-CM

## 2022-09-12 DIAGNOSIS — Z87.51 HISTORY OF PRETERM DELIVERY: ICD-10-CM

## 2022-09-12 DIAGNOSIS — O13.3 PREGNANCY-INDUCED HYPERTENSION, THIRD TRIMESTER: Primary | ICD-10-CM

## 2022-09-12 DIAGNOSIS — Z86.19 HISTORY OF GENITAL WARTS: ICD-10-CM

## 2022-09-12 DIAGNOSIS — O99.019 ANTEPARTUM ANEMIA: ICD-10-CM

## 2022-09-12 LAB
ALBUMIN SERPL-MCNC: 3.5 G/DL (ref 3.5–5.2)
ALBUMIN/GLOB SERPL: 1.3 G/DL
ALP SERPL-CCNC: 110 U/L (ref 39–117)
ALT SERPL W P-5'-P-CCNC: 10 U/L (ref 1–33)
AMPHET+METHAMPHET UR QL: NEGATIVE
AMPHETAMINES UR QL: NEGATIVE
ANION GAP SERPL CALCULATED.3IONS-SCNC: 6.8 MMOL/L (ref 5–15)
AST SERPL-CCNC: 15 U/L (ref 1–32)
BACTERIA UR QL AUTO: ABNORMAL /HPF
BARBITURATES UR QL SCN: NEGATIVE
BENZODIAZ UR QL SCN: NEGATIVE
BILIRUB SERPL-MCNC: 0.2 MG/DL (ref 0–1.2)
BILIRUB UR QL STRIP: NEGATIVE
BUN SERPL-MCNC: 6 MG/DL (ref 6–20)
BUN/CREAT SERPL: 9.5 (ref 7–25)
BUPRENORPHINE SERPL-MCNC: NEGATIVE NG/ML
CALCIUM SPEC-SCNC: 9.5 MG/DL (ref 8.6–10.5)
CANNABINOIDS SERPL QL: NEGATIVE
CHLORIDE SERPL-SCNC: 103 MMOL/L (ref 98–107)
CLARITY UR: ABNORMAL
CO2 SERPL-SCNC: 24.2 MMOL/L (ref 22–29)
COCAINE UR QL: NEGATIVE
COLOR UR: YELLOW
CREAT SERPL-MCNC: 0.63 MG/DL (ref 0.57–1)
CREAT UR-MCNC: 23 MG/DL
DEPRECATED RDW RBC AUTO: 43.2 FL (ref 37–54)
EGFRCR SERPLBLD CKD-EPI 2021: 120.3 ML/MIN/1.73
ERYTHROCYTE [DISTWIDTH] IN BLOOD BY AUTOMATED COUNT: 14 % (ref 12.3–15.4)
GLOBULIN UR ELPH-MCNC: 2.8 GM/DL
GLUCOSE SERPL-MCNC: 78 MG/DL (ref 65–99)
GLUCOSE UR STRIP-MCNC: NEGATIVE MG/DL
HCT VFR BLD AUTO: 38.5 % (ref 34–46.6)
HGB BLD-MCNC: 12.7 G/DL (ref 12–15.9)
HGB UR QL STRIP.AUTO: NEGATIVE
HYALINE CASTS UR QL AUTO: ABNORMAL /LPF
KETONES UR QL STRIP: NEGATIVE
LEUKOCYTE ESTERASE UR QL STRIP.AUTO: ABNORMAL
MCH RBC QN AUTO: 28.2 PG (ref 26.6–33)
MCHC RBC AUTO-ENTMCNC: 33 G/DL (ref 31.5–35.7)
MCV RBC AUTO: 85.6 FL (ref 79–97)
METHADONE UR QL SCN: NEGATIVE
NITRITE UR QL STRIP: NEGATIVE
OPIATES UR QL: NEGATIVE
OXYCODONE UR QL SCN: NEGATIVE
PCP UR QL SCN: NEGATIVE
PH UR STRIP.AUTO: 7 [PH] (ref 4.5–8)
PLATELET # BLD AUTO: 159 10*3/MM3 (ref 140–450)
PMV BLD AUTO: 11.3 FL (ref 6–12)
POTASSIUM SERPL-SCNC: 4.1 MMOL/L (ref 3.5–5.2)
PROPOXYPH UR QL: NEGATIVE
PROT ?TM UR-MCNC: <4 MG/DL
PROT SERPL-MCNC: 6.3 G/DL (ref 6–8.5)
PROT UR QL STRIP: NEGATIVE
PROT/CREAT UR: NORMAL MG/G{CREAT}
RBC # BLD AUTO: 4.5 10*6/MM3 (ref 3.77–5.28)
RBC # UR STRIP: ABNORMAL /HPF
REF LAB TEST METHOD: ABNORMAL
SODIUM SERPL-SCNC: 134 MMOL/L (ref 136–145)
SP GR UR STRIP: 1.01 (ref 1–1.03)
SQUAMOUS #/AREA URNS HPF: ABNORMAL /HPF
TRICYCLICS UR QL SCN: NEGATIVE
URATE SERPL-MCNC: 4.6 MG/DL (ref 2.4–5.7)
UROBILINOGEN UR QL STRIP: ABNORMAL
WBC # UR STRIP: ABNORMAL /HPF
WBC NRBC COR # BLD: 8.99 10*3/MM3 (ref 3.4–10.8)

## 2022-09-12 PROCEDURE — G0463 HOSPITAL OUTPT CLINIC VISIT: HCPCS

## 2022-09-12 PROCEDURE — 84550 ASSAY OF BLOOD/URIC ACID: CPT | Performed by: OBSTETRICS & GYNECOLOGY

## 2022-09-12 PROCEDURE — 84156 ASSAY OF PROTEIN URINE: CPT | Performed by: OBSTETRICS & GYNECOLOGY

## 2022-09-12 PROCEDURE — 59025 FETAL NON-STRESS TEST: CPT

## 2022-09-12 PROCEDURE — 82570 ASSAY OF URINE CREATININE: CPT | Performed by: OBSTETRICS & GYNECOLOGY

## 2022-09-12 PROCEDURE — 80306 DRUG TEST PRSMV INSTRMNT: CPT | Performed by: OBSTETRICS & GYNECOLOGY

## 2022-09-12 PROCEDURE — 25010000002 BETAMETHASONE ACET & SOD PHOS PER 4 MG: Performed by: OBSTETRICS & GYNECOLOGY

## 2022-09-12 PROCEDURE — 96372 THER/PROPH/DIAG INJ SC/IM: CPT

## 2022-09-12 PROCEDURE — 0502F SUBSEQUENT PRENATAL CARE: CPT | Performed by: STUDENT IN AN ORGANIZED HEALTH CARE EDUCATION/TRAINING PROGRAM

## 2022-09-12 PROCEDURE — 85027 COMPLETE CBC AUTOMATED: CPT | Performed by: OBSTETRICS & GYNECOLOGY

## 2022-09-12 PROCEDURE — 81001 URINALYSIS AUTO W/SCOPE: CPT | Performed by: OBSTETRICS & GYNECOLOGY

## 2022-09-12 PROCEDURE — 80053 COMPREHEN METABOLIC PANEL: CPT | Performed by: OBSTETRICS & GYNECOLOGY

## 2022-09-12 RX ORDER — BETAMETHASONE SODIUM PHOSPHATE AND BETAMETHASONE ACETATE 3; 3 MG/ML; MG/ML
12.5 INJECTION, SUSPENSION INTRA-ARTICULAR; INTRALESIONAL; INTRAMUSCULAR; SOFT TISSUE ONCE
Status: COMPLETED | OUTPATIENT
Start: 2022-09-12 | End: 2022-09-12

## 2022-09-12 RX ORDER — SODIUM CHLORIDE 0.9 % (FLUSH) 0.9 %
3 SYRINGE (ML) INJECTION EVERY 12 HOURS SCHEDULED
Status: DISCONTINUED | OUTPATIENT
Start: 2022-09-12 | End: 2022-09-12 | Stop reason: HOSPADM

## 2022-09-12 RX ORDER — LIDOCAINE HYDROCHLORIDE 10 MG/ML
5 INJECTION, SOLUTION EPIDURAL; INFILTRATION; INTRACAUDAL; PERINEURAL AS NEEDED
Status: DISCONTINUED | OUTPATIENT
Start: 2022-09-12 | End: 2022-09-12 | Stop reason: HOSPADM

## 2022-09-12 RX ORDER — SODIUM CHLORIDE, SODIUM LACTATE, POTASSIUM CHLORIDE, CALCIUM CHLORIDE 600; 310; 30; 20 MG/100ML; MG/100ML; MG/100ML; MG/100ML
125 INJECTION, SOLUTION INTRAVENOUS CONTINUOUS
Status: DISCONTINUED | OUTPATIENT
Start: 2022-09-12 | End: 2022-09-12 | Stop reason: HOSPADM

## 2022-09-12 RX ORDER — SODIUM CHLORIDE 0.9 % (FLUSH) 0.9 %
10 SYRINGE (ML) INJECTION AS NEEDED
Status: DISCONTINUED | OUTPATIENT
Start: 2022-09-12 | End: 2022-09-12 | Stop reason: HOSPADM

## 2022-09-12 RX ADMIN — BETAMETHASONE ACETATE AND BETAMETHASONE SODIUM PHOSPHATE 12.5 MG: 3; 3 INJECTION, SUSPENSION INTRA-ARTICULAR; INTRALESIONAL; INTRAMUSCULAR; SOFT TISSUE at 10:24

## 2022-09-12 NOTE — PROGRESS NOTES
32 y.o.    35w6d  weeks presents for a COB appointment for a desired pregnancy.  She denies bleeding, cramping and abnormal vaginal discharge. This is a desired pregnancy. Today she presents for a B/P check as she has a hx of Pre-e SF. Denies HA, RUQ pin or vision changes.      EDC:             by [  X ] LMP  [   ] US       G  3     P   0111           1. Hx Of Pre-e SF   Baseline labs WNL   ASA daily   B/P checks starting 35wga ( Today > 160/100)      2. Hx of PTL     2/2 Pre-e SF           3. Hx of HSV?   Denies prodrome ; HSV prophylaxis started 35wga                    4.  Anemia   Rx Fe+ taking            5.                                      Meds:     Current Outpatient Medications:   •  aspirin 81 MG oral suspension, , Disp: , Rfl:   •  ferrous gluconate (FERGON) 324 MG tablet, Take 1 tablet by mouth Daily With Breakfast., Disp: 90 tablet, Rfl: 1  •  Prenatal Vit-Fe Fumarate-FA (PRENATAL VITAMIN 27-0.8) 27-0.8 MG tablet tablet, Take  by mouth Daily., Disp: , Rfl:   •  valACYclovir (Valtrex) 500 MG tablet, Take 1 tablet by mouth 2 (Two) Times a Day., Disp: 60 tablet, Rfl: 1           PN Labs [  X ] Normal   [   ] Rh positive  [   ] Rh negative   [   ] RhoGam 28 wks             Ultrasounds:        1st trimester date:    [id number]    Wks:       , consistent with dates       2nd US:  date:     EGA:      [ X  ] CWD    Placenta: Fundal     Anatomy:  WNL            Postpartum contraception:    [facility]            Infant feeding plan: Breastfeeding                                              Date:              Feeling fine.   Periods are regular.             Active Ambulatory Problems     Diagnosis Date Noted   • History of genital warts ( PRIVATE)     • Family history of blood coagulation disorder ( prothrombin gene mutation, pt tested negative) 2020   • H/O pre-eclampsia, 24 hour urine= 108 mg ( 2022), on ASA 81 mg 2022   • Pregnancy 2022   • History of  delivery- IOL at 34.5 for  severe preeclampsia 2022   • Need for Tdap vaccination 2022   • Fundal height low for dates 2022   • Antepartum anemia 2022           Resolved Ambulatory Problems     Diagnosis Date Noted   • Cerumen impaction 2016   • CD (contact dermatitis) 2016   • Blues 2016   • Fatigue 2016   • Congested 2016   • Abnormal presence of protein in urine 2016   • Acne vulgaris 2016   • Onychia of toe of left foot 2016   • Abnormal Pap smear of cervix 2017   • Use teratogenic medication in female of reproductive age 2018   • Elevated blood pressure reading in office without diagnosis of hypertension 2019   • Initial obstetric visit in first trimester 2022           Past Medical History:   Diagnosis Date   • Acne     • HPV (human papilloma virus) infection     • Preeclampsia     • Urogenital trichomoniasis                   Surgical History         Past Surgical History:   Procedure Laterality Date   • FINGER SURGERY       • INDUCED        • WISDOM TOOTH EXTRACTION                  POB hx:  ; pre e SF   PGYN hx:    Social hx:   Social History   Social History            Socioeconomic History   • Marital status: Single   Tobacco Use   • Smoking status: Never Smoker   • Smokeless tobacco: Never Used   Substance and Sexual Activity   • Alcohol use: No   • Drug use: No   • Sexual activity: Yes       Partners: Male       Birth control/protection: None           [  X ] no h/o abuse/DV:         [ X  ] No ETOH, tobacco, drugs  [   ] Tobacco use   [   ] Alcohol use  [   ] Drug use   Marital status:           [  X ] FOB involved     Significant family hx:         Family History   Problem Relation Age of Onset   • No Known Problems Father     • No Known Problems Mother     • Diabetes Maternal Grandmother     • Factor V Leiden deficiency Other     • Breast cancer Neg Hx     • Ovarian cancer Neg Hx     • Colon cancer Neg Hx                       [ X  ] No birth defects, stillbirth                Allergies   Allergen Reactions   • Doxycycline Anaphylaxis                 Meds: See above     PE: V/S as noted   +FCA and + FM       A/P   32 y.o.    35w6d  With concern for Pre-e Sf based on er B/P and her hx. Today will order EFW, CBC, CMP, Pr:cr ratio , 24 hour ua ( if needed) and BMZ 12mg. Discussed pt her Dx and recommendation to fo to L&D ASAP and she agrees. Discussed with Dr. Kirby ( colleague) for possible IOL vs IOL and Mg+. Discussed with L&D charge RN.          1. Hx Of Pre-e SF   Baseline labs WNL   ASA daily   B/P checks starting 35wga ; Concern for Pre-e ; Pressures 130/80 bt creeping up   Sent to L&D for probable admission ([id number])      2. Hx of PTL   2/2 Pre-e SF           3. Hx of HSV?   Denies prodrome ; HSV prophylaxis started 35wga                    4.  Anemia   Rx Fe+ taking     5. GBS+  Prophylaxis on L&D

## 2022-09-12 NOTE — NURSING NOTE
Pt arrives from MD office to LDRT#2 with elevated B/P.  Reports dull frontal HA.  Denies visual changes or epigastric pain/discomfort.  Lt ankle noted with 3+ edema and Rt ankle with 2+ edema.  See assessment for remaining edema.  DTR's WNL.  Will proceed with labs, serial B/P's, and fetal monitoring.

## 2022-09-12 NOTE — PROGRESS NOTES
31 yo  with IUP @ 35.6 weeks sent over from the office for elevated  /110.  The patient has mildly elevated BPs in triage ( 130s- 140s/90s)  and a reactive NST. . P: CR ratio sent out but is pending. LFTs, CR, platelets are all normal. Pt sent home with a 24 hour urine to turn in tomorrow. She received her first dose of BMZ today for fetal lung maturity. She will f/u in the office twice a week for BPP/NST and will be seen this Thursday for BPP/NST/ office visit. She will get her second dose of BMZ tomorrow when she turns in her 24 hour urine. She has been scheduled for IOL next week at 37.0 weeks for elevated BPs ( diagnosis being developed).  Her US for growth today= 43%, ( 6.3#) , LUIS- 15 cm, vertex, . Us compared to last scan on 2022. Pt was counseled by Dr. Cadena.

## 2022-09-12 NOTE — NURSING NOTE
Discussed & reviewed home written instructions with pt. Understands to returns 24 hr urine collection to hospital lab on 9/13/2022 at 0940 and [facility]

## 2022-09-12 NOTE — NON STRESS TEST
Anup Guzman, a  at 35w6d with an SUSAN of 10/11/2022, by Last Menstrual Period, was seen at [facility] for a nonstress test.    Chief Complaint   Patient presents with   • Elevated Blood Pressure     In MD office       Patient Active Problem List   Diagnosis   • History of genital warts ( PRIVATE)   • Family history of blood coagulation disorder ( prothrombin gene mutation, pt tested negative)   • H/O pre-eclampsia, 24 hour urine= 108 mg ( 2022), on ASA 81 mg   • Pregnancy   • History of  delivery- IOL at 34.5 for severe preeclampsia   • Need for Tdap vaccination   • Fundal height low for dates   • Antepartum anemia   • Pregnancy-induced hypertension, third trimester       Start Time: 1000  Stop Time: 1230    Interpretation A  Nonstress Test Interpretation A: Reactive

## 2022-09-12 NOTE — DISCHARGE INSTRUCTIONS
Collect urine and keep cold until 0940 AM on 9/13/2022.  Bring to hospital lab for testing.  [facility].

## 2022-09-13 ENCOUNTER — LAB (OUTPATIENT)
Dept: LAB | Facility: HOSPITAL | Age: 32
End: 2022-09-13

## 2022-09-13 ENCOUNTER — HOSPITAL ENCOUNTER (OUTPATIENT)
Facility: HOSPITAL | Age: 32
Discharge: HOME | End: 2022-09-14
Attending: OBSTETRICS & GYNECOLOGY | Admitting: OBSTETRICS & GYNECOLOGY

## 2022-09-13 ENCOUNTER — HOSPITAL ENCOUNTER (OUTPATIENT)
Facility: HOSPITAL | Age: 32
Discharge: HOME | End: 2022-09-13
Attending: OBSTETRICS & GYNECOLOGY | Admitting: OBSTETRICS & GYNECOLOGY

## 2022-09-13 DIAGNOSIS — O13.3 PREGNANCY-INDUCED HYPERTENSION, THIRD TRIMESTER: ICD-10-CM

## 2022-09-13 LAB
COLLECT DURATION TIME UR: 24 HRS
PROT 24H UR-MRATE: 137.2 MG/24HOURS (ref 0–150)
SPECIMEN VOL 24H UR: 1400 ML

## 2022-09-13 PROCEDURE — G0463 HOSPITAL OUTPT CLINIC VISIT: HCPCS

## 2022-09-13 PROCEDURE — 25010000002 DEXAMETHASONE PER 1 MG: Performed by: OBSTETRICS & GYNECOLOGY

## 2022-09-13 PROCEDURE — 96372 THER/PROPH/DIAG INJ SC/IM: CPT

## 2022-09-13 PROCEDURE — 81050 URINALYSIS VOLUME MEASURE: CPT

## 2022-09-13 PROCEDURE — 25010000002 DEXAMETHASONE PER 1 MG

## 2022-09-13 PROCEDURE — 84156 ASSAY OF PROTEIN URINE: CPT

## 2022-09-13 RX ORDER — DEXAMETHASONE SODIUM PHOSPHATE 10 MG/ML
6 INJECTION INTRAMUSCULAR; INTRAVENOUS ONCE
Qty: 0.6 ML | Refills: 0 | Status: SHIPPED | OUTPATIENT
Start: 2022-09-13 | End: 2022-09-13

## 2022-09-13 RX ORDER — BETAMETHASONE SODIUM PHOSPHATE AND BETAMETHASONE ACETATE 3; 3 MG/ML; MG/ML
12 INJECTION, SUSPENSION INTRA-ARTICULAR; INTRALESIONAL; INTRAMUSCULAR; SOFT TISSUE EVERY 24 HOURS
Status: DISCONTINUED | OUTPATIENT
Start: 2022-09-13 | End: 2022-09-13

## 2022-09-13 RX ORDER — DEXAMETHASONE SODIUM PHOSPHATE 10 MG/ML
6 INJECTION INTRAMUSCULAR; INTRAVENOUS ONCE
Status: COMPLETED | OUTPATIENT
Start: 2022-09-14 | End: 2022-09-13

## 2022-09-13 RX ORDER — DEXAMETHASONE SODIUM PHOSPHATE 10 MG/ML
6 INJECTION INTRAMUSCULAR; INTRAVENOUS ONCE
Status: DISCONTINUED | OUTPATIENT
Start: 2022-09-14 | End: 2022-09-13

## 2022-09-13 RX ORDER — DEXAMETHASONE SODIUM PHOSPHATE 10 MG/ML
INJECTION INTRAMUSCULAR; INTRAVENOUS
Status: COMPLETED
Start: 2022-09-13 | End: 2022-09-13

## 2022-09-13 RX ORDER — DEXAMETHASONE SODIUM PHOSPHATE 10 MG/ML
6 INJECTION INTRAMUSCULAR; INTRAVENOUS EVERY 12 HOURS
Status: DISCONTINUED | OUTPATIENT
Start: 2022-09-13 | End: 2022-09-13 | Stop reason: HOSPADM

## 2022-09-13 RX ADMIN — DEXAMETHASONE SODIUM PHOSPHATE 6 MG: 10 INJECTION INTRAMUSCULAR; INTRAVENOUS at 23:44

## 2022-09-13 RX ADMIN — DEXAMETHASONE SODIUM PHOSPHATE 6 MG: 10 INJECTION INTRAMUSCULAR; INTRAVENOUS at 11:19

## 2022-09-14 VITALS
DIASTOLIC BLOOD PRESSURE: 87 MMHG | TEMPERATURE: 99.1 F | HEART RATE: 75 BPM | SYSTOLIC BLOOD PRESSURE: 130 MMHG | RESPIRATION RATE: 18 BRPM

## 2022-09-14 LAB
ABO GROUP BLD: NORMAL
ABO GROUP BLD: NORMAL
BASOPHILS # BLD AUTO: 0.02 10*3/MM3 (ref 0–0.2)
BASOPHILS NFR BLD AUTO: 0.2 % (ref 0–1.5)
BILIRUB UR QL STRIP: NEGATIVE
BLD GP AB SCN SERPL QL: NEGATIVE
CLARITY UR: ABNORMAL
COLOR UR: YELLOW
DEPRECATED RDW RBC AUTO: 43.6 FL (ref 37–54)
EOSINOPHIL # BLD AUTO: 0 10*3/MM3 (ref 0–0.4)
EOSINOPHIL NFR BLD AUTO: 0 % (ref 0.3–6.2)
ERYTHROCYTE [DISTWIDTH] IN BLOOD BY AUTOMATED COUNT: 14.3 % (ref 12.3–15.4)
GLUCOSE UR STRIP-MCNC: NEGATIVE MG/DL
HCT VFR BLD AUTO: 33.9 % (ref 34–46.6)
HGB BLD-MCNC: 11.7 G/DL (ref 12–15.9)
HGB UR QL STRIP.AUTO: NEGATIVE
IMM GRANULOCYTES # BLD AUTO: 0.13 10*3/MM3 (ref 0–0.05)
IMM GRANULOCYTES NFR BLD AUTO: 1.2 % (ref 0–0.5)
KETONES UR QL STRIP: NEGATIVE
LEUKOCYTE ESTERASE UR QL STRIP.AUTO: NEGATIVE
LYMPHOCYTES # BLD AUTO: 2.39 10*3/MM3 (ref 0.7–3.1)
LYMPHOCYTES NFR BLD AUTO: 22.2 % (ref 19.6–45.3)
MCH RBC QN AUTO: 29.3 PG (ref 26.6–33)
MCHC RBC AUTO-ENTMCNC: 34.5 G/DL (ref 31.5–35.7)
MCV RBC AUTO: 85 FL (ref 79–97)
MONOCYTES # BLD AUTO: 0.95 10*3/MM3 (ref 0.1–0.9)
MONOCYTES NFR BLD AUTO: 8.8 % (ref 5–12)
NEUTROPHILS NFR BLD AUTO: 67.6 % (ref 42.7–76)
NEUTROPHILS NFR BLD AUTO: 7.3 10*3/MM3 (ref 1.7–7)
NITRITE UR QL STRIP: NEGATIVE
NRBC BLD AUTO-RTO: 0 /100 WBC (ref 0–0.2)
PH UR STRIP.AUTO: 7 [PH] (ref 4.5–8)
PLATELET # BLD AUTO: 156 10*3/MM3 (ref 140–450)
PMV BLD AUTO: 11.7 FL (ref 6–12)
PROT UR QL STRIP: NEGATIVE
RBC # BLD AUTO: 3.99 10*6/MM3 (ref 3.77–5.28)
RH BLD: POSITIVE
RH BLD: POSITIVE
SP GR UR STRIP: 1.02 (ref 1–1.03)
T&S EXPIRATION DATE: NORMAL
UROBILINOGEN UR QL STRIP: ABNORMAL
WBC NRBC COR # BLD: 10.79 10*3/MM3 (ref 3.4–10.8)

## 2022-09-14 PROCEDURE — 86900 BLOOD TYPING SEROLOGIC ABO: CPT

## 2022-09-14 PROCEDURE — 36415 COLL VENOUS BLD VENIPUNCTURE: CPT | Performed by: OBSTETRICS & GYNECOLOGY

## 2022-09-14 PROCEDURE — 86850 RBC ANTIBODY SCREEN: CPT | Performed by: OBSTETRICS & GYNECOLOGY

## 2022-09-14 PROCEDURE — 81003 URINALYSIS AUTO W/O SCOPE: CPT | Performed by: OBSTETRICS & GYNECOLOGY

## 2022-09-14 PROCEDURE — 85025 COMPLETE CBC W/AUTO DIFF WBC: CPT | Performed by: OBSTETRICS & GYNECOLOGY

## 2022-09-14 PROCEDURE — 86901 BLOOD TYPING SEROLOGIC RH(D): CPT

## 2022-09-14 PROCEDURE — 86901 BLOOD TYPING SEROLOGIC RH(D): CPT | Performed by: OBSTETRICS & GYNECOLOGY

## 2022-09-14 PROCEDURE — 86900 BLOOD TYPING SEROLOGIC ABO: CPT | Performed by: OBSTETRICS & GYNECOLOGY

## 2022-09-14 RX ORDER — SODIUM CHLORIDE 0.9 % (FLUSH) 0.9 %
10 SYRINGE (ML) INJECTION AS NEEDED
Status: DISCONTINUED | OUTPATIENT
Start: 2022-09-14 | End: 2022-09-14 | Stop reason: HOSPADM

## 2022-09-14 RX ORDER — SODIUM CHLORIDE 0.9 % (FLUSH) 0.9 %
10 SYRINGE (ML) INJECTION EVERY 12 HOURS SCHEDULED
Status: DISCONTINUED | OUTPATIENT
Start: 2022-09-14 | End: 2022-09-14 | Stop reason: HOSPADM

## 2022-09-14 NOTE — SIGNIFICANT NOTE
09/13/22 2345 09/13/22 2349   Vital Signs   Temp 99.1 °F (37.3 °C)  --    Temp src Oral  --    Heart Rate 85 87   Resp 18  --    /94 159/88   BP Location Left arm Left arm   BP Method Automatic Automatic   Patient Position Lying Sitting     Dr Villagomez alerted of pt's high Bps during appointment for second dexamethasone injection. Per MD to have pt stay for monitoring and observation.

## 2022-09-14 NOTE — NURSING NOTE
Patient informed MD and RN reviewed vital signs and strip. MD ordered strict bedrest until next OB appointment. Patient reports next appointment 9/15. Discharge instructions reviewed with patient written and verbal. Patient denies questions and/or concerns at this time. Ambulated off unit in no signs of distress.

## 2022-09-14 NOTE — PROGRESS NOTES
I called and s/w pt by phone and informed her that her 24 hour urine was normal and to keep her scheduled followup.

## 2022-09-14 NOTE — DISCHARGE INSTRUCTIONS
Bedrest. Keep next scheduled appointment with [facility]. Call MD with questions or concerns regarding self and/or fetus. Warning signs to monitor for headache, dizziness, RUQ pain, nausea/vomiting. Call MD with complaints of painful regular contractions, vaginal bleeding, decreased fetal movement, leaking or rupture of fluid.

## 2022-09-15 ENCOUNTER — ROUTINE PRENATAL (OUTPATIENT)
Dept: OBSTETRICS AND GYNECOLOGY | Facility: CLINIC | Age: 32
End: 2022-09-15

## 2022-09-15 VITALS — BODY MASS INDEX: 32.01 KG/M2 | DIASTOLIC BLOOD PRESSURE: 80 MMHG | WEIGHT: 175 LBS | SYSTOLIC BLOOD PRESSURE: 124 MMHG

## 2022-09-15 DIAGNOSIS — Z3A.36 36 WEEKS GESTATION OF PREGNANCY: Primary | ICD-10-CM

## 2022-09-15 DIAGNOSIS — Z87.51 HISTORY OF PRETERM DELIVERY: ICD-10-CM

## 2022-09-15 DIAGNOSIS — Z86.19 HISTORY OF GENITAL WARTS: ICD-10-CM

## 2022-09-15 DIAGNOSIS — B95.1 POSITIVE GBS TEST: ICD-10-CM

## 2022-09-15 DIAGNOSIS — O99.019 ANTEPARTUM ANEMIA: ICD-10-CM

## 2022-09-15 DIAGNOSIS — O13.3 PREGNANCY-INDUCED HYPERTENSION, THIRD TRIMESTER: ICD-10-CM

## 2022-09-15 LAB
GLUCOSE UR STRIP-MCNC: NEGATIVE MG/DL
PROT UR STRIP-MCNC: NEGATIVE MG/DL

## 2022-09-15 PROCEDURE — 0502F SUBSEQUENT PRENATAL CARE: CPT | Performed by: STUDENT IN AN ORGANIZED HEALTH CARE EDUCATION/TRAINING PROGRAM

## 2022-09-15 RX ORDER — DEXAMETHASONE SODIUM PHOSPHATE 10 MG/ML
INJECTION INTRAMUSCULAR; INTRAVENOUS
Status: ON HOLD | COMMUNITY
Start: 2022-09-13 | End: 2022-09-20

## 2022-09-15 NOTE — PROGRESS NOTES
32 y.o.    36w2d  weeks presents for a [facility] appointment for a desired pregnancy.  She denies bleeding, cramping and abnormal vaginal discharge. She has GHTN and has been having twice weekly NST and B/P assessment. Denies s/s of Pre-e SF and completed steroids last week for lung maturity.      EDC:             by [  X ] LMP  [   ] US       G  3     P   0111           1. Hx Of Pre-e SF   Baseline labs WNL   ASA daily   B/P checks starting 35wga      2. Hx of PTL   2/2 Pre-e SF           3. Hx of HSV?   Denies prodrome ; HSV prophylaxis started 35wga                    4.  Anemia   Rx Fe+ taking            5.                                      Meds:     Current Outpatient Medications:   •  aspirin 81 MG oral suspension, , Disp: , Rfl:   •  ferrous gluconate (FERGON) 324 MG tablet, Take 1 tablet by mouth Daily With Breakfast., Disp: 90 tablet, Rfl: 1  •  Prenatal Vit-Fe Fumarate-FA (PRENATAL VITAMIN 27-0.8) 27-0.8 MG tablet tablet, Take  by mouth Daily., Disp: , Rfl:   •  valACYclovir (Valtrex) 500 MG tablet, Take 1 tablet by mouth 2 (Two) Times a Day., Disp: 60 tablet, Rfl: 1           PN Labs [  X ] Normal   [   ] Rh positive  [   ] Rh negative   [   ] RhoGam 28 wks             Ultrasounds:        1st trimester date:    [id number]    Wks:       , consistent with dates       2nd US:  date:     EGA:      [ X  ] CWD    Placenta: Fundal     Anatomy:  WNL            Postpartum contraception:    [facility]            Infant feeding plan: Breastfeeding                                              Date:           ROS:    Feeling fine.   Periods are regular.             Active Ambulatory Problems     Diagnosis Date Noted   • History of genital warts ( PRIVATE)     • Family history of blood coagulation disorder ( prothrombin gene mutation, pt tested negative) 2020   • H/O pre-eclampsia, 24 hour urine= 108 mg ( 2022), on ASA 81 mg 2022   • Pregnancy 2022   • History of  delivery- IOL at 34.5 for  severe preeclampsia 2022   • Need for Tdap vaccination 2022   • Fundal height low for dates 2022   • Antepartum anemia 2022           Resolved Ambulatory Problems     Diagnosis Date Noted   • Cerumen impaction 2016   • CD (contact dermatitis) 2016   • Blues 2016   • Fatigue 2016   • Congested 2016   • Abnormal presence of protein in urine 2016   • Acne vulgaris 2016   • Onychia of toe of left foot 2016   • Abnormal Pap smear of cervix 2017   • Use teratogenic medication in female of reproductive age 2018   • Elevated blood pressure reading in office without diagnosis of hypertension 2019   • Initial obstetric visit in first trimester 2022      Past Medical History:   Diagnosis Date   • Acne     • HPV (human papilloma virus) infection     • Preeclampsia     • Urogenital trichomoniasis                         Past Surgical History:   Procedure Laterality Date   • FINGER SURGERY       • INDUCED        • WISDOM TOOTH EXTRACTION               POB hx:  ; pre e SF   PGYN hx:    Social hx:   Social History            Socioeconomic History   • Marital status: Single   Tobacco Use   • Smoking status: Never Smoker   • Smokeless tobacco: Never Used   Substance and Sexual Activity   • Alcohol use: No   • Drug use: No   • Sexual activity: Yes       Partners: Male       Birth control/protection: None        [  X ] no h/o abuse/DV:         [ X  ] No ETOH, tobacco, drugs  [   ] Tobacco use   [   ] Alcohol use  [   ] Drug use   Marital status:           [  X ] FOB involved     Significant family hx:         Family History   Problem Relation Age of Onset   • No Known Problems Father     • No Known Problems Mother     • Diabetes Maternal Grandmother     • Factor V Leiden deficiency Other     • Breast cancer Neg Hx     • Ovarian cancer Neg Hx     • Colon cancer Neg Hx                      [ X  ] No birth defects,  stillbirth                Allergies   Allergen Reactions   • Doxycycline Anaphylaxis                 PE   /80       General Appearance:  Awake. Alert. Well developed. Well nourished. In no acute distress.    Visual Inspection: ° Abdomen was normal on visual inspection.  Palpation: ° Abdomen was soft. ° Abdominal non-tender.    Uterus: ° Fundal height was normal for gestational age. ° Not tender.  Uterine Adnexae: ° Normal without masses or tenderness.  Neurological:  ° Oriented to time, place, and person.  Skin:  ° General appearance was normal. No bruising or ecchymosis.  Obstetrical: NST reactive   Meds: See above       A/P   32 y.o.    36w2d  Doing well today. GBS positive; completed steroids with concern for Pre-e SF last week , started HSV prophylaxis. Discussed s/s of Pre-e SF. Recommend IOL 37wga for GHTN .  All questions answered           1. Hx Of Pre-e SF   Baseline labs WNL   ASA daily   B/P checks starting 35wga ; Concern for Pre-e ; Pressures 130/80 bt creeping up  Steroid complete  IOL 37wga for GHTN      2. Hx of PTL   2/2 Pre-e SF           3. Hx of HSV?   Denies prodrome ; HSV prophylaxis started 35wga                    4.  Anemia   Rx Fe+ taking     5. GBS + Tx in L&D

## 2022-09-20 ENCOUNTER — HOSPITAL ENCOUNTER (INPATIENT)
Facility: HOSPITAL | Age: 32
LOS: 2 days | Discharge: HOME | End: 2022-09-22
Attending: OBSTETRICS & GYNECOLOGY | Admitting: OBSTETRICS & GYNECOLOGY

## 2022-09-20 ENCOUNTER — HOSPITAL ENCOUNTER (OUTPATIENT)
Dept: LABOR AND DELIVERY | Facility: HOSPITAL | Age: 32
Discharge: HOME | End: 2022-09-20

## 2022-09-20 ENCOUNTER — ANESTHESIA (OUTPATIENT)
Dept: OBSTETRICS AND GYNECOLOGY | Facility: HOSPITAL | Age: 32
End: 2022-09-20

## 2022-09-20 ENCOUNTER — ANESTHESIA EVENT (OUTPATIENT)
Dept: OBSTETRICS AND GYNECOLOGY | Facility: HOSPITAL | Age: 32
End: 2022-09-20

## 2022-09-20 PROBLEM — O26.849 FUNDAL HEIGHT LOW FOR DATES: Status: RESOLVED | Noted: 2022-07-21 | Resolved: 2022-09-20

## 2022-09-20 PROBLEM — O99.019 ANTEPARTUM ANEMIA: Status: RESOLVED | Noted: 2022-07-29 | Resolved: 2022-09-20

## 2022-09-20 LAB
ABO GROUP BLD: NORMAL
ALBUMIN SERPL-MCNC: 3.3 G/DL (ref 3.5–5.2)
ALBUMIN/GLOB SERPL: 1.1 G/DL
ALP SERPL-CCNC: 112 U/L (ref 39–117)
ALT SERPL W P-5'-P-CCNC: 20 U/L (ref 1–33)
AMPHET+METHAMPHET UR QL: NEGATIVE
AMPHETAMINES UR QL: NEGATIVE
ANION GAP SERPL CALCULATED.3IONS-SCNC: 10.8 MMOL/L (ref 5–15)
AST SERPL-CCNC: 17 U/L (ref 1–32)
BARBITURATES UR QL SCN: NEGATIVE
BENZODIAZ UR QL SCN: NEGATIVE
BILIRUB SERPL-MCNC: 0.2 MG/DL (ref 0–1.2)
BLD GP AB SCN SERPL QL: NEGATIVE
BUN SERPL-MCNC: 10 MG/DL (ref 6–20)
BUN/CREAT SERPL: 15.9 (ref 7–25)
BUPRENORPHINE SERPL-MCNC: NEGATIVE NG/ML
CALCIUM SPEC-SCNC: 9 MG/DL (ref 8.6–10.5)
CANNABINOIDS SERPL QL: NEGATIVE
CHLORIDE SERPL-SCNC: 102 MMOL/L (ref 98–107)
CO2 SERPL-SCNC: 20.2 MMOL/L (ref 22–29)
COCAINE UR QL: NEGATIVE
CREAT SERPL-MCNC: 0.63 MG/DL (ref 0.57–1)
DEPRECATED RDW RBC AUTO: 44.6 FL (ref 37–54)
EGFRCR SERPLBLD CKD-EPI 2021: 120.3 ML/MIN/1.73
ERYTHROCYTE [DISTWIDTH] IN BLOOD BY AUTOMATED COUNT: 14.2 % (ref 12.3–15.4)
GLOBULIN UR ELPH-MCNC: 3 GM/DL
GLUCOSE SERPL-MCNC: 79 MG/DL (ref 65–99)
HCT VFR BLD AUTO: 36.2 % (ref 34–46.6)
HGB BLD-MCNC: 12.2 G/DL (ref 12–15.9)
MCH RBC QN AUTO: 28.6 PG (ref 26.6–33)
MCHC RBC AUTO-ENTMCNC: 33.7 G/DL (ref 31.5–35.7)
MCV RBC AUTO: 85 FL (ref 79–97)
METHADONE UR QL SCN: NEGATIVE
OPIATES UR QL: NEGATIVE
OXYCODONE UR QL SCN: NEGATIVE
PCP UR QL SCN: NEGATIVE
PLATELET # BLD AUTO: 145 10*3/MM3 (ref 140–450)
PMV BLD AUTO: 11.5 FL (ref 6–12)
POTASSIUM SERPL-SCNC: 4.2 MMOL/L (ref 3.5–5.2)
PROPOXYPH UR QL: NEGATIVE
PROT SERPL-MCNC: 6.3 G/DL (ref 6–8.5)
RBC # BLD AUTO: 4.26 10*6/MM3 (ref 3.77–5.28)
RH BLD: POSITIVE
SODIUM SERPL-SCNC: 133 MMOL/L (ref 136–145)
T&S EXPIRATION DATE: NORMAL
TRICYCLICS UR QL SCN: NEGATIVE
WBC NRBC COR # BLD: 8.34 10*3/MM3 (ref 3.4–10.8)

## 2022-09-20 PROCEDURE — 86901 BLOOD TYPING SEROLOGIC RH(D): CPT | Performed by: STUDENT IN AN ORGANIZED HEALTH CARE EDUCATION/TRAINING PROGRAM

## 2022-09-20 PROCEDURE — 86850 RBC ANTIBODY SCREEN: CPT | Performed by: STUDENT IN AN ORGANIZED HEALTH CARE EDUCATION/TRAINING PROGRAM

## 2022-09-20 PROCEDURE — 59400 OBSTETRICAL CARE: CPT | Performed by: OBSTETRICS & GYNECOLOGY

## 2022-09-20 PROCEDURE — 85027 COMPLETE CBC AUTOMATED: CPT | Performed by: STUDENT IN AN ORGANIZED HEALTH CARE EDUCATION/TRAINING PROGRAM

## 2022-09-20 PROCEDURE — 25010000002 PENICILLIN G POTASSIUM PER 600000 UNITS: Performed by: STUDENT IN AN ORGANIZED HEALTH CARE EDUCATION/TRAINING PROGRAM

## 2022-09-20 PROCEDURE — 88307 TISSUE EXAM BY PATHOLOGIST: CPT

## 2022-09-20 PROCEDURE — 25010000002 TERBUTALINE PER 1 MG

## 2022-09-20 PROCEDURE — C1755 CATHETER, INTRASPINAL: HCPCS | Performed by: REGISTERED NURSE

## 2022-09-20 PROCEDURE — 3E033VJ INTRODUCTION OF OTHER HORMONE INTO PERIPHERAL VEIN, PERCUTANEOUS APPROACH: ICD-10-PCS | Performed by: OBSTETRICS & GYNECOLOGY

## 2022-09-20 PROCEDURE — 86900 BLOOD TYPING SEROLOGIC ABO: CPT | Performed by: STUDENT IN AN ORGANIZED HEALTH CARE EDUCATION/TRAINING PROGRAM

## 2022-09-20 PROCEDURE — 3E0DXGC INTRODUCTION OF OTHER THERAPEUTIC SUBSTANCE INTO MOUTH AND PHARYNX, EXTERNAL APPROACH: ICD-10-PCS | Performed by: OBSTETRICS & GYNECOLOGY

## 2022-09-20 PROCEDURE — 80053 COMPREHEN METABOLIC PANEL: CPT | Performed by: OBSTETRICS & GYNECOLOGY

## 2022-09-20 PROCEDURE — S0260 H&P FOR SURGERY: HCPCS | Performed by: STUDENT IN AN ORGANIZED HEALTH CARE EDUCATION/TRAINING PROGRAM

## 2022-09-20 PROCEDURE — 80306 DRUG TEST PRSMV INSTRMNT: CPT | Performed by: STUDENT IN AN ORGANIZED HEALTH CARE EDUCATION/TRAINING PROGRAM

## 2022-09-20 RX ORDER — DOCUSATE SODIUM 100 MG/1
100 CAPSULE, LIQUID FILLED ORAL 2 TIMES DAILY
Status: DISCONTINUED | OUTPATIENT
Start: 2022-09-20 | End: 2022-09-22 | Stop reason: HOSPADM

## 2022-09-20 RX ORDER — OXYTOCIN/0.9 % SODIUM CHLORIDE 30/500 ML
85 PLASTIC BAG, INJECTION (ML) INTRAVENOUS ONCE
Status: COMPLETED | OUTPATIENT
Start: 2022-09-20 | End: 2022-09-20

## 2022-09-20 RX ORDER — SODIUM CHLORIDE, SODIUM LACTATE, POTASSIUM CHLORIDE, CALCIUM CHLORIDE 600; 310; 30; 20 MG/100ML; MG/100ML; MG/100ML; MG/100ML
125 INJECTION, SOLUTION INTRAVENOUS CONTINUOUS
Status: DISCONTINUED | OUTPATIENT
Start: 2022-09-20 | End: 2022-09-20

## 2022-09-20 RX ORDER — PROMETHAZINE HYDROCHLORIDE 25 MG/1
25 TABLET ORAL EVERY 6 HOURS PRN
Status: DISCONTINUED | OUTPATIENT
Start: 2022-09-20 | End: 2022-09-22 | Stop reason: HOSPADM

## 2022-09-20 RX ORDER — ONDANSETRON 4 MG/1
4 TABLET, FILM COATED ORAL EVERY 6 HOURS PRN
Status: DISCONTINUED | OUTPATIENT
Start: 2022-09-20 | End: 2022-09-22 | Stop reason: HOSPADM

## 2022-09-20 RX ORDER — EPHEDRINE SULFATE 50 MG/ML
10 INJECTION, SOLUTION INTRAVENOUS
Status: DISCONTINUED | OUTPATIENT
Start: 2022-09-20 | End: 2022-09-20

## 2022-09-20 RX ORDER — OXYTOCIN/0.9 % SODIUM CHLORIDE 30/500 ML
PLASTIC BAG, INJECTION (ML) INTRAVENOUS
Status: COMPLETED
Start: 2022-09-20 | End: 2022-09-20

## 2022-09-20 RX ORDER — LIDOCAINE HYDROCHLORIDE AND EPINEPHRINE 15; 5 MG/ML; UG/ML
INJECTION, SOLUTION EPIDURAL AS NEEDED
Status: DISCONTINUED | OUTPATIENT
Start: 2022-09-20 | End: 2022-09-20 | Stop reason: SURG

## 2022-09-20 RX ORDER — PENICILLIN G 3000000 [IU]/50ML
3 INJECTION, SOLUTION INTRAVENOUS EVERY 4 HOURS
Status: DISCONTINUED | OUTPATIENT
Start: 2022-09-20 | End: 2022-09-20

## 2022-09-20 RX ORDER — OXYTOCIN/0.9 % SODIUM CHLORIDE 30/500 ML
650 PLASTIC BAG, INJECTION (ML) INTRAVENOUS ONCE
Status: DISCONTINUED | OUTPATIENT
Start: 2022-09-20 | End: 2022-09-22 | Stop reason: HOSPADM

## 2022-09-20 RX ORDER — CARBOPROST TROMETHAMINE 250 UG/ML
250 INJECTION, SOLUTION INTRAMUSCULAR
Status: DISCONTINUED | OUTPATIENT
Start: 2022-09-20 | End: 2022-09-22 | Stop reason: HOSPADM

## 2022-09-20 RX ORDER — FENTANYL 0.2 MG/100ML-BUPIV 0.125%-NACL 0.9% EPIDURAL INJ 2/0.125 MCG/ML-%
SOLUTION INJECTION
Status: COMPLETED
Start: 2022-09-20 | End: 2022-09-20

## 2022-09-20 RX ORDER — ONDANSETRON 2 MG/ML
4 INJECTION INTRAMUSCULAR; INTRAVENOUS EVERY 6 HOURS PRN
Status: DISCONTINUED | OUTPATIENT
Start: 2022-09-20 | End: 2022-09-22 | Stop reason: HOSPADM

## 2022-09-20 RX ORDER — MISOPROSTOL 200 UG/1
800 TABLET ORAL ONCE AS NEEDED
Status: DISCONTINUED | OUTPATIENT
Start: 2022-09-20 | End: 2022-09-22 | Stop reason: HOSPADM

## 2022-09-20 RX ORDER — OXYTOCIN/0.9 % SODIUM CHLORIDE 30/500 ML
650 PLASTIC BAG, INJECTION (ML) INTRAVENOUS ONCE
Status: COMPLETED | OUTPATIENT
Start: 2022-09-20 | End: 2022-09-20

## 2022-09-20 RX ORDER — HYDROCORTISONE 25 MG/G
1 CREAM TOPICAL AS NEEDED
Status: DISCONTINUED | OUTPATIENT
Start: 2022-09-20 | End: 2022-09-22 | Stop reason: HOSPADM

## 2022-09-20 RX ORDER — IBUPROFEN 800 MG/1
800 TABLET ORAL 3 TIMES DAILY
Status: DISCONTINUED | OUTPATIENT
Start: 2022-09-20 | End: 2022-09-22 | Stop reason: HOSPADM

## 2022-09-20 RX ORDER — SODIUM CHLORIDE 0.9 % (FLUSH) 0.9 %
10 SYRINGE (ML) INJECTION EVERY 12 HOURS SCHEDULED
Status: DISCONTINUED | OUTPATIENT
Start: 2022-09-20 | End: 2022-09-20

## 2022-09-20 RX ORDER — TERBUTALINE SULFATE 1 MG/ML
INJECTION, SOLUTION SUBCUTANEOUS
Status: COMPLETED
Start: 2022-09-20 | End: 2022-09-20

## 2022-09-20 RX ORDER — SODIUM CHLORIDE 0.9 % (FLUSH) 0.9 %
10 SYRINGE (ML) INJECTION AS NEEDED
Status: DISCONTINUED | OUTPATIENT
Start: 2022-09-20 | End: 2022-09-20

## 2022-09-20 RX ORDER — OXYTOCIN/0.9 % SODIUM CHLORIDE 30/500 ML
2-20 PLASTIC BAG, INJECTION (ML) INTRAVENOUS
Status: DISCONTINUED | OUTPATIENT
Start: 2022-09-20 | End: 2022-09-20

## 2022-09-20 RX ORDER — TERBUTALINE SULFATE 1 MG/ML
0.25 INJECTION, SOLUTION SUBCUTANEOUS ONCE
Status: COMPLETED | OUTPATIENT
Start: 2022-09-20 | End: 2022-09-20

## 2022-09-20 RX ORDER — HYDROCODONE BITARTRATE AND ACETAMINOPHEN 5; 325 MG/1; MG/1
1 TABLET ORAL EVERY 4 HOURS PRN
Status: DISCONTINUED | OUTPATIENT
Start: 2022-09-20 | End: 2022-09-22 | Stop reason: HOSPADM

## 2022-09-20 RX ORDER — SODIUM CHLORIDE, SODIUM LACTATE, POTASSIUM CHLORIDE, CALCIUM CHLORIDE 600; 310; 30; 20 MG/100ML; MG/100ML; MG/100ML; MG/100ML
125 INJECTION, SOLUTION INTRAVENOUS CONTINUOUS
Status: DISCONTINUED | OUTPATIENT
Start: 2022-09-20 | End: 2022-09-22 | Stop reason: HOSPADM

## 2022-09-20 RX ORDER — PRENATAL VIT/IRON FUM/FOLIC AC 27MG-0.8MG
1 TABLET ORAL DAILY
Status: DISCONTINUED | OUTPATIENT
Start: 2022-09-20 | End: 2022-09-22 | Stop reason: HOSPADM

## 2022-09-20 RX ORDER — BISACODYL 10 MG
10 SUPPOSITORY, RECTAL RECTAL DAILY PRN
Status: DISCONTINUED | OUTPATIENT
Start: 2022-09-21 | End: 2022-09-22 | Stop reason: HOSPADM

## 2022-09-20 RX ORDER — FENTANYL 0.2 MG/100ML-BUPIV 0.125%-NACL 0.9% EPIDURAL INJ 2/0.125 MCG/ML-%
SOLUTION INJECTION CONTINUOUS
Status: DISCONTINUED | OUTPATIENT
Start: 2022-09-20 | End: 2022-09-20

## 2022-09-20 RX ORDER — PROMETHAZINE HYDROCHLORIDE 25 MG/1
12.5 SUPPOSITORY RECTAL EVERY 6 HOURS PRN
Status: DISCONTINUED | OUTPATIENT
Start: 2022-09-20 | End: 2022-09-22 | Stop reason: HOSPADM

## 2022-09-20 RX ORDER — VALACYCLOVIR HYDROCHLORIDE 500 MG/1
500 TABLET, FILM COATED ORAL EVERY 12 HOURS SCHEDULED
Status: DISCONTINUED | OUTPATIENT
Start: 2022-09-20 | End: 2022-09-22 | Stop reason: HOSPADM

## 2022-09-20 RX ORDER — METHYLERGONOVINE MALEATE 0.2 MG/ML
200 INJECTION INTRAVENOUS ONCE AS NEEDED
Status: DISCONTINUED | OUTPATIENT
Start: 2022-09-20 | End: 2022-09-22 | Stop reason: HOSPADM

## 2022-09-20 RX ORDER — MISOPROSTOL 100 MCG
25 TABLET ORAL
Status: DISCONTINUED | OUTPATIENT
Start: 2022-09-20 | End: 2022-09-20

## 2022-09-20 RX ORDER — FERROUS GLUCONATE 324(37.5)
324 TABLET ORAL
Status: DISCONTINUED | OUTPATIENT
Start: 2022-09-21 | End: 2022-09-22 | Stop reason: HOSPADM

## 2022-09-20 RX ORDER — LIDOCAINE HYDROCHLORIDE 10 MG/ML
5 INJECTION, SOLUTION EPIDURAL; INFILTRATION; INTRACAUDAL; PERINEURAL AS NEEDED
Status: DISCONTINUED | OUTPATIENT
Start: 2022-09-20 | End: 2022-09-20

## 2022-09-20 RX ORDER — SODIUM CHLORIDE 0.9 % (FLUSH) 0.9 %
1-10 SYRINGE (ML) INJECTION AS NEEDED
Status: DISCONTINUED | OUTPATIENT
Start: 2022-09-20 | End: 2022-09-22 | Stop reason: HOSPADM

## 2022-09-20 RX ADMIN — TERBUTALINE SULFATE 0.25 MG: 1 INJECTION, SOLUTION SUBCUTANEOUS at 12:27

## 2022-09-20 RX ADMIN — PENICILLIN G 3 MILLION UNITS: 3000000 INJECTION, SOLUTION INTRAVENOUS at 13:35

## 2022-09-20 RX ADMIN — SODIUM CHLORIDE, POTASSIUM CHLORIDE, SODIUM LACTATE AND CALCIUM CHLORIDE 125 ML/HR: 600; 310; 30; 20 INJECTION, SOLUTION INTRAVENOUS at 05:50

## 2022-09-20 RX ADMIN — SODIUM CHLORIDE, POTASSIUM CHLORIDE, SODIUM LACTATE AND CALCIUM CHLORIDE 125 ML/HR: 600; 310; 30; 20 INJECTION, SOLUTION INTRAVENOUS at 11:43

## 2022-09-20 RX ADMIN — MISOPROSTOL 25 MCG: 100 TABLET ORAL at 06:36

## 2022-09-20 RX ADMIN — Medication 2 MILLI-UNITS/MIN: at 10:30

## 2022-09-20 RX ADMIN — PENICILLIN G 3 MILLION UNITS: 3000000 INJECTION, SOLUTION INTRAVENOUS at 09:17

## 2022-09-20 RX ADMIN — SODIUM CHLORIDE 5 MILLION UNITS: 900 INJECTION INTRAVENOUS at 06:37

## 2022-09-20 RX ADMIN — LIDOCAINE HYDROCHLORIDE AND EPINEPHRINE 3 ML: 15; 5 INJECTION, SOLUTION EPIDURAL at 11:36

## 2022-09-20 RX ADMIN — OXYTOCIN-SODIUM CHLORIDE 0.9% IV SOLN 30 UNIT/500ML 2 MILLI-UNITS/MIN: 30-0.9/5 SOLUTION at 10:30

## 2022-09-20 RX ADMIN — SODIUM CHLORIDE, POTASSIUM CHLORIDE, SODIUM LACTATE AND CALCIUM CHLORIDE 125 ML/HR: 600; 310; 30; 20 INJECTION, SOLUTION INTRAVENOUS at 16:37

## 2022-09-20 RX ADMIN — OXYTOCIN-SODIUM CHLORIDE 0.9% IV SOLN 30 UNIT/500ML 650 ML/HR: 30-0.9/5 SOLUTION at 17:41

## 2022-09-20 RX ADMIN — OXYTOCIN-SODIUM CHLORIDE 0.9% IV SOLN 30 UNIT/500ML 85 ML/HR: 30-0.9/5 SOLUTION at 19:51

## 2022-09-20 RX ADMIN — Medication 10 ML/HR: at 11:47

## 2022-09-20 RX ADMIN — TERBUTALINE SULFATE 0.25 MG: 1 INJECTION SUBCUTANEOUS at 12:27

## 2022-09-20 RX ADMIN — OXYTOCIN-SODIUM CHLORIDE 0.9% IV SOLN 30 UNIT/500ML 85 ML/HR: 30-0.9/5 SOLUTION at 18:11

## 2022-09-20 NOTE — ANESTHESIA PREPROCEDURE EVALUATION
Anesthesia Evaluation     Patient summary reviewed and Nursing notes reviewed                Airway   Mallampati: II  TM distance: >3 FB  Neck ROM: full  Dental - normal exam     Pulmonary - negative pulmonary ROS and normal exam   Cardiovascular - normal exam  Exercise tolerance: good (4-7 METS)    (+) hypertension,       Neuro/Psych- negative ROS  GI/Hepatic/Renal/Endo - negative ROS     Musculoskeletal     Abdominal  - normal exam   Substance History - negative use     OB/GYN    (+) Pregnant,         Other - negative ROS                     Anesthesia Plan    ASA 2     epidural       Anesthetic plan, risks, benefits, and alternatives have been provided, discussed and informed consent has been obtained with: patient.    Use of blood products discussed with patient  Consented to blood products.       CODE STATUS:

## 2022-09-20 NOTE — ANESTHESIA PROCEDURE NOTES
Epidural Block      Patient reassessed immediately prior to procedure    Patient location during procedure: OB  Start Time: 9/20/2022 11:26 AM  Stop Time: 9/20/2022 11:36 AM  Indication:at surgeon's request and post-op pain management  Performed By  CRNA/CAA: Troy Kruger CRNA  Preanesthetic Checklist  Completed: patient identified, IV checked, site marked, risks and benefits discussed, surgical consent, monitors and equipment checked, pre-op evaluation and timeout performed  Prep:  Pt Position:sitting  Sterile Tech:cap, gloves, mask and sterile barrier  Prep:povidone-iodine 7.5% surgical scrub  Monitoring:blood pressure monitoring, continuous pulse oximetry and EKG  Epidural Block Procedure:  Approach:midline  Guidance:landmark technique  Location:lumbar  Level:4-5  Needle Type:Tuohy  Needle Gauge:17 G  Loss of Resistance Medium: saline  Loss of Resistance: 8cm  Cath Depth at skin:13 cm  Paresthesia: none  Aspiration:negative  Test Dose:negative  Med administered at 9/20/2022 11:40 AM  Post Assessment:  Dressing:occlusive dressing applied and secured with tape  Pt Tolerance:patient tolerated the procedure well with no apparent complications  Complications:no

## 2022-09-21 LAB
BASOPHILS # BLD AUTO: 0.03 10*3/MM3 (ref 0–0.2)
BASOPHILS NFR BLD AUTO: 0.2 % (ref 0–1.5)
DEPRECATED RDW RBC AUTO: 45 FL (ref 37–54)
EOSINOPHIL # BLD AUTO: 0.04 10*3/MM3 (ref 0–0.4)
EOSINOPHIL NFR BLD AUTO: 0.3 % (ref 0.3–6.2)
ERYTHROCYTE [DISTWIDTH] IN BLOOD BY AUTOMATED COUNT: 14.5 % (ref 12.3–15.4)
HCT VFR BLD AUTO: 35.5 % (ref 34–46.6)
HGB BLD-MCNC: 12 G/DL (ref 12–15.9)
IMM GRANULOCYTES # BLD AUTO: 0.07 10*3/MM3 (ref 0–0.05)
IMM GRANULOCYTES NFR BLD AUTO: 0.6 % (ref 0–0.5)
LYMPHOCYTES # BLD AUTO: 2.63 10*3/MM3 (ref 0.7–3.1)
LYMPHOCYTES NFR BLD AUTO: 21.6 % (ref 19.6–45.3)
MCH RBC QN AUTO: 29.1 PG (ref 26.6–33)
MCHC RBC AUTO-ENTMCNC: 33.8 G/DL (ref 31.5–35.7)
MCV RBC AUTO: 86 FL (ref 79–97)
MONOCYTES # BLD AUTO: 0.89 10*3/MM3 (ref 0.1–0.9)
MONOCYTES NFR BLD AUTO: 7.3 % (ref 5–12)
NEUTROPHILS NFR BLD AUTO: 70 % (ref 42.7–76)
NEUTROPHILS NFR BLD AUTO: 8.52 10*3/MM3 (ref 1.7–7)
NRBC BLD AUTO-RTO: 0 /100 WBC (ref 0–0.2)
PLATELET # BLD AUTO: 154 10*3/MM3 (ref 140–450)
PMV BLD AUTO: 11.2 FL (ref 6–12)
RBC # BLD AUTO: 4.13 10*6/MM3 (ref 3.77–5.28)
WBC NRBC COR # BLD: 12.18 10*3/MM3 (ref 3.4–10.8)

## 2022-09-21 PROCEDURE — 85025 COMPLETE CBC W/AUTO DIFF WBC: CPT | Performed by: OBSTETRICS & GYNECOLOGY

## 2022-09-21 PROCEDURE — 0503F POSTPARTUM CARE VISIT: CPT | Performed by: OBSTETRICS & GYNECOLOGY

## 2022-09-21 RX ADMIN — IBUPROFEN 800 MG: 800 TABLET, FILM COATED ORAL at 11:32

## 2022-09-21 RX ADMIN — PRENATAL VIT W/ FE FUMARATE-FA TAB 27-0.8 MG 1 TABLET: 27-0.8 TAB at 08:26

## 2022-09-21 RX ADMIN — DOCUSATE SODIUM 100 MG: 100 CAPSULE, LIQUID FILLED ORAL at 21:21

## 2022-09-21 RX ADMIN — Medication 324 MG: at 08:26

## 2022-09-21 RX ADMIN — VALACYCLOVIR HYDROCHLORIDE 500 MG: 500 TABLET, FILM COATED ORAL at 21:21

## 2022-09-21 RX ADMIN — DOCUSATE SODIUM 100 MG: 100 CAPSULE, LIQUID FILLED ORAL at 08:26

## 2022-09-21 RX ADMIN — Medication 1 APPLICATION: at 11:32

## 2022-09-21 RX ADMIN — IBUPROFEN 800 MG: 800 TABLET, FILM COATED ORAL at 18:46

## 2022-09-21 RX ADMIN — VALACYCLOVIR HYDROCHLORIDE 500 MG: 500 TABLET, FILM COATED ORAL at 08:26

## 2022-09-22 VITALS
WEIGHT: 175 LBS | TEMPERATURE: 98.1 F | SYSTOLIC BLOOD PRESSURE: 151 MMHG | DIASTOLIC BLOOD PRESSURE: 90 MMHG | OXYGEN SATURATION: 97 % | BODY MASS INDEX: 32.01 KG/M2 | HEART RATE: 72 BPM | RESPIRATION RATE: 18 BRPM

## 2022-09-22 PROCEDURE — 0503F POSTPARTUM CARE VISIT: CPT | Performed by: NURSE PRACTITIONER

## 2022-09-22 RX ORDER — IBUPROFEN 800 MG/1
800 TABLET ORAL 3 TIMES DAILY
Qty: 30 TABLET | Refills: 0 | Status: SHIPPED | OUTPATIENT
Start: 2022-09-22 | End: 2022-10-21

## 2022-09-22 RX ADMIN — DOCUSATE SODIUM 100 MG: 100 CAPSULE, LIQUID FILLED ORAL at 09:00

## 2022-09-22 RX ADMIN — PRENATAL VIT W/ FE FUMARATE-FA TAB 27-0.8 MG 1 TABLET: 27-0.8 TAB at 09:00

## 2022-09-26 ENCOUNTER — TELEPHONE (OUTPATIENT)
Dept: OBSTETRICS AND GYNECOLOGY | Facility: CLINIC | Age: 32
End: 2022-09-26

## 2022-09-26 NOTE — TELEPHONE ENCOUNTER
Caller: RICKI DONNELLY    Pharmacy: [facility]     Phone Number: [phone] OK TO [facility]     Reason for Call: PT IS BREASTFEEDING & HAS A COLD/ ALLERGY , CONGESTION    SLIGHTLY SCRATCHY THROAT    WHAT IS SAFE TO TAKE     SYMPTOMS STARTED YESTERDAY

## 2022-09-28 LAB
LAB AP CASE REPORT: NORMAL
PATH REPORT.FINAL DX SPEC: NORMAL

## 2022-10-17 ENCOUNTER — TELEPHONE (OUTPATIENT)
Dept: OBSTETRICS AND GYNECOLOGY | Facility: CLINIC | Age: 32
End: 2022-10-17

## 2022-10-17 NOTE — TELEPHONE ENCOUNTER
Caller: Anup Guzman    Relationship: Self    Best call back number: [phone]    What form or medical record are you requesting: SHORT TERM DISABILITY PAPERWORK  PT STATED SHE DROPPED IT OFF ON Thursday AND WOULD NOW LIKE IT TO BE FAXED    Who is requesting this form or medical record from you: EMPLOYER    How would you like to receive the form or medical records (pick-up, mail, fax): FAX  If fax, what is the fax number: [phone]    Timeframe paperwork needed: ASAP

## 2022-10-21 ENCOUNTER — POSTPARTUM VISIT (OUTPATIENT)
Dept: OBSTETRICS AND GYNECOLOGY | Facility: CLINIC | Age: 32
End: 2022-10-21

## 2022-10-21 VITALS
HEIGHT: 62 IN | DIASTOLIC BLOOD PRESSURE: 88 MMHG | WEIGHT: 155 LBS | SYSTOLIC BLOOD PRESSURE: 128 MMHG | BODY MASS INDEX: 28.52 KG/M2

## 2022-10-21 DIAGNOSIS — Z30.41 ENCOUNTER FOR SURVEILLANCE OF CONTRACEPTIVE PILLS: Primary | ICD-10-CM

## 2022-10-21 PROBLEM — B95.1 POSITIVE GBS TEST: Status: RESOLVED | Noted: 2022-09-15 | Resolved: 2022-10-21

## 2022-10-21 PROBLEM — Z23 NEED FOR TDAP VACCINATION: Status: RESOLVED | Noted: 2022-07-21 | Resolved: 2022-10-21

## 2022-10-21 PROCEDURE — 99213 OFFICE O/P EST LOW 20 MIN: CPT | Performed by: STUDENT IN AN ORGANIZED HEALTH CARE EDUCATION/TRAINING PROGRAM

## 2022-10-21 RX ORDER — IBUPROFEN 800 MG/1
800 TABLET ORAL EVERY 8 HOURS PRN
Qty: 30 TABLET | Refills: 0 | Status: SHIPPED | OUTPATIENT
Start: 2022-10-21 | End: 2022-10-21

## 2022-10-21 RX ORDER — DROSPIRENONE AND ETHINYL ESTRADIOL 0.02-3(28)
1 KIT ORAL DAILY
Qty: 28 TABLET | Refills: 12 | Status: SHIPPED | OUTPATIENT
Start: 2022-10-21 | End: 2023-10-21

## 2022-10-21 RX ORDER — IBUPROFEN 800 MG/1
800 TABLET ORAL EVERY 8 HOURS PRN
Qty: 30 TABLET | Refills: 0 | Status: SHIPPED | OUTPATIENT
Start: 2022-10-21

## 2022-10-21 NOTE — PROGRESS NOTES
31 yo  here today for Left sided Mastitis. Has stopped BF and desires Tx. Would also like birth control. Time not sexually active.       [id number]; Uncomplicated      PMH:   Past Medical History:   Diagnosis Date   • Abnormal Pap smear of cervix 2017- LGSIl, 2016- ASCUS + HPV, 2017 normal   • Acne    • Anemia    • Gestational hypertension    • History of genital warts    • HPV (human papilloma virus) infection     h/o genital warts   • Preeclampsia    • Urogenital trichomoniasis                 PSH:     Past Surgical History:   Procedure Laterality Date   • FINGER SURGERY     • INDUCED      • WISDOM TOOTH EXTRACTION              POB hx:   PGYN hx:    Contraception Ravinder   Menarche        Social hx:   Social History     Socioeconomic History   • Marital status: Single   Tobacco Use   • Smoking status: Never   • Smokeless tobacco: Never   Substance and Sexual Activity   • Alcohol use: No   • Drug use: No   • Sexual activity: Yes     Partners: Male     Birth control/protection: None       Marital status:           [  X ] FOB involved     Significant family hx:   Denies                 [ X  ] No birth defects, stillbirth           Allergies:       Allergies   Allergen Reactions   • Doxycycline Anaphylaxis                  Meds:   Current Outpatient Medications:   •  dicloxacillin (DYNAPEN) 500 MG capsule, Take 1 capsule by mouth 4 (Four) Times a Day for 10 days., Disp: 40 capsule, Rfl: 0  •  drospirenone-ethinyl estradiol (RAVINDER) 3-0.02 MG per tablet, Take 1 tablet by mouth Daily., Disp: 28 tablet, Rfl: 12  •  ibuprofen (ADVIL,MOTRIN) 800 MG tablet, Take 1 tablet by mouth Every 8 (Eight) Hours As Needed for Moderate Pain (pain)., Disp: 30 tablet, Rfl: 0  •  Prenatal Vit-Fe Fumarate-FA (PRENATAL VITAMIN 27-0.8) 27-0.8 MG tablet tablet, Take  by mouth Daily., Disp: , Rfl:      Review of Systems   See HPI     Vitals:    10/21/22 1104   BP: 128/88          OBGyn Exam     Vitals: VSS;  AF    General Appearance:  Awake. Alert. Well developed. Well nourished. In no acute distress.      Breasts:  General/bilateral:  ° Nipples showed no abnormalities. ° No breast asymmetry was observed. ° No scar on breast. ° No breast mass was found. ° Left tenderness of the breast.Erythema Left side   Lungs:  ° Clear to auscultation bilaterally without wheezes, rales or rhonchi.  Cardiovascular:  ° System: RRR, no murmur, consistent with normal pregnancy.  Abdomen:  Visual Inspection: ° Abdomen was normal on visual inspection.  Palpation: ° Abdomen was soft. ° Abdominal non-tender.  Genitalia:  Defer     Neurological:  ° Oriented to time, place, and person.  Skin:  ° General appearance was normal. No bruising or ecchymosis.    Diagnoses and all orders for this visit:    1. Encounter for surveillance of contraceptive pills (Primary)    2. Mastitis associated with childbirth, delivered    3. Postpartum follow-up    Other orders  -     Discontinue: ibuprofen (ADVIL,MOTRIN) 800 MG tablet; Take 1 tablet by mouth Every 8 (Eight) Hours As Needed for Moderate Pain (pain).  Dispense: 30 tablet; Refill: 0  -     drospirenone-ethinyl estradiol (RAVINDER) 3-0.02 MG per tablet; Take 1 tablet by mouth Daily.  Dispense: 28 tablet; Refill: 12  -     ibuprofen (ADVIL,MOTRIN) 800 MG tablet; Take 1 tablet by mouth Every 8 (Eight) Hours As Needed for Moderate Pain (pain).  Dispense: 30 tablet; Refill: 0  -     dicloxacillin (DYNAPEN) 500 MG capsule; Take 1 capsule by mouth 4 (Four) Times a Day for 10 days.  Dispense: 40 capsule; Refill: 0     1) Ravinder start 6 weeks PP     2) Abx for Mastitis; if continue f/u ASAP     I spent 30 minutes caring for Anup on this date of service. This time includes time spent by me in the following activities: preparing for the visit, reviewing tests, obtaining and/or reviewing a separately obtained history, performing a medically appropriate examination and/or evaluation, counseling and educating the  patient/family/caregiver, ordering medications, tests, or procedures, documenting information in the medical record, independently interpreting results and communicating that information with the patient/family/caregiver and care coordination     Primitivo Cadena DO  10/21/2022    12:11 EDT

## 2022-10-25 ENCOUNTER — TELEPHONE (OUTPATIENT)
Dept: OBSTETRICS AND GYNECOLOGY | Facility: CLINIC | Age: 32
End: 2022-10-25

## 2022-10-25 NOTE — TELEPHONE ENCOUNTER
Caller: Anup Guzman    Relationship: Self    Best call back number: [phone]    What form or medical record are you requesting: SHORT TERM DISABILITY PAPERWORK    Who is requesting this form or medical record from you: EMPLOYER    How would you like to receive the form or medical records (pick-up, mail, fax):   PLEASE FAX TO EMPLOYER AT FAX# [phone]    Timeframe paperwork needed: ASAP    Additional notes: EMPLOYER DID NOT RECEIVE PAPERWORK WHEN IT WAS FAXED ON 10/17/22.  PLEASE FAX TO THE NEW FAX# [phone]

## 2022-11-02 ENCOUNTER — TELEPHONE (OUTPATIENT)
Dept: OBSTETRICS AND GYNECOLOGY | Facility: CLINIC | Age: 32
End: 2022-11-02

## 2022-11-02 NOTE — TELEPHONE ENCOUNTER
Provider: DR GTZ  Caller: RICKI DONNELLY  Relationship to Patient: SELF  Reason for Call: SAME DAY CANCEL - EMERGENCY

## 2023-07-13 NOTE — PROGRESS NOTES
OB follow up     Anup Guzman is a 29 y.o.  17w0d being seen today for her obstetrical visit.  Patient reports no bleeding, no contractions and no leaking. Fetal movement: normal.  Patient reports new information today about a family member with a coagulation disorder that required Lovenox therapy throughout pregnancy.  Patient is concerned because she is pregnant and they are planning to move to [place] and have a very long car ride coming up.    Review of Systems  No bleeding, No cramping/contractions     /72   Wt 65 kg (143 lb 6.4 oz)   LMP 10/30/2019   BMI 27.10 kg/m²     FHT: present BPM   Uterine Size: 17 cm       Assessment/Plan:    1) 29 y.o.  -pregnancy at 17w0d    2)   Encounter Diagnoses   Name Primary?   • Normal pregnancy in second trimester Yes   • Family history of blood coagulation disorder    Heme-onc referral for testing and anticoagulation if indicated.  Ultrasound anatomy in 3 weeks.    3) Reviewed this stage of pregnancy  4) Problem list updated     Return in about 3 weeks (around 3/10/2020) for US, Anatomy, OB Tummy.      Micky Horn MD    2020  12:28 PM  
Vishnu Fagan(Attending)

## 2024-01-23 ENCOUNTER — OFFICE VISIT (OUTPATIENT)
Dept: FAMILY MEDICINE CLINIC | Facility: CLINIC | Age: 34
End: 2024-01-23
Payer: COMMERCIAL

## 2024-01-23 VITALS
DIASTOLIC BLOOD PRESSURE: 72 MMHG | BODY MASS INDEX: 23.19 KG/M2 | SYSTOLIC BLOOD PRESSURE: 120 MMHG | HEART RATE: 71 BPM | WEIGHT: 126 LBS | HEIGHT: 62 IN | TEMPERATURE: 97.8 F | OXYGEN SATURATION: 99 %

## 2024-01-23 DIAGNOSIS — J30.9 ALLERGIC RHINITIS, UNSPECIFIED SEASONALITY, UNSPECIFIED TRIGGER: ICD-10-CM

## 2024-01-23 DIAGNOSIS — H61.21 IMPACTED CERUMEN OF RIGHT EAR: ICD-10-CM

## 2024-01-23 DIAGNOSIS — Z76.89 ENCOUNTER TO ESTABLISH CARE: Primary | ICD-10-CM

## 2024-01-23 RX ORDER — LORATADINE 10 MG/1
10 TABLET ORAL DAILY
Qty: 90 TABLET | Refills: 3 | Status: SHIPPED | OUTPATIENT
Start: 2024-01-23

## 2024-01-23 RX ORDER — MONTELUKAST SODIUM 10 MG/1
10 TABLET ORAL NIGHTLY
Qty: 90 TABLET | Refills: 3 | Status: SHIPPED | OUTPATIENT
Start: 2024-01-23

## 2024-01-23 RX ORDER — FLUTICASONE PROPIONATE 50 MCG
2 SPRAY, SUSPENSION (ML) NASAL DAILY
Qty: 16 G | Refills: 2 | Status: SHIPPED | OUTPATIENT
Start: 2024-01-23

## 2024-01-23 NOTE — PROGRESS NOTES
"Chief Complaint   Patient presents with    Establish Care    Sinusitis     Occurs every 3 months, nasal drainage is constant- uses otc allergy medications .        Upper Respiratory Infection: Patient complains of possible sinusitis. Symptoms include congestion, cough, and sore throat. Onset of symptoms was several years ago, gradually worsening since that time. She also c/o congestion, no  fever, post nasal drip, sinus pressure, sneezing, and sore throat for the past 3 months .  She is drinking plenty of fluids. Evaluation to date: none. Treatment to date: antihistamines and decongestants.  Ill contacts at home or school or work discussed. None.      [place]:  No symptoms.  Moved back to [place] 2 years ago and sinus issues reoccurred.     Had been followed regularly with OB.  Youngest is 16 months.      The following portions of the patient's history were reviewed and updated as appropriate: allergies, current medications, past family history, past medical history, past social history, past surgical history and problem list.        Vitals:    01/23/24 1316   BP: 120/72   BP Location: Left arm   Patient Position: Sitting   Cuff Size: Adult   Pulse: 71   Temp: 97.8 °F (36.6 °C)   SpO2: 99%   Weight: 57.2 kg (126 lb)   Height: 157.5 cm (62.01\")     Gen: Mildly ill appearing, alert  Ears: Right cerumen impaction  Nose:  Congestion  Throat:  Pink without exudate, some drainage  Neck: No LAD  Lung: Good air movement, regular RR  Heart: RR without murmur  Skin: No rash    Ear Cerumen Removal    Date/Time: 1/25/2024 2:03 PM    Performed by: Dasha Lamar APRN  Authorized by: Dasha Lamar APRN  Consent: Verbal consent obtained.  Risks and benefits: risks, benefits and alternatives were discussed  Consent given by: patient  Patient understanding: patient states understanding of the procedure being performed  Time out: Immediately prior to procedure a \"time out\" was called to verify the correct patient, procedure, equipment, " support staff and site/side marked as required.    Anesthesia:  Local Anesthetic: none  Location details: right ear  Patient tolerance: patient tolerated the procedure well with no immediate complications  Comments: Moderate amount irrigated from right ear.  TM normal.    Procedure type: irrigation   Sedation:  Patient sedated: no            Assessment & Plan   Diagnoses and all orders for this visit:    1. Encounter to establish care (Primary)    2. Impacted cerumen of right ear  -     Ear Cerumen Removal    3. Allergic rhinitis, unspecified seasonality, unspecified trigger  -     montelukast (Singulair) 10 MG tablet; Take 1 tablet by mouth Every Night.  Dispense: 90 tablet; Refill: 3  -     fluticasone (FLONASE) 50 MCG/ACT nasal spray; 2 sprays into the nostril(s) as directed by provider Daily.  Dispense: 16 g; Refill: 2  -     loratadine (Claritin) 10 MG tablet; Take 1 tablet by mouth Daily.  Dispense: 90 tablet; Refill: 3           Tylenol or Advil as needed for pain, fever, muscle aches  Plenty of fluids  Hand washing discussed  Off work or school note given if needed.  Warm tea for throat.  Pros and cons of antibiotic use discussed.  Instructed to notify us if symptoms worsen or do not improve.      SHELLEY Vitale  Family Practice  [facility] [place]

## 2024-01-24 ENCOUNTER — PATIENT ROUNDING (BHMG ONLY) (OUTPATIENT)
Dept: FAMILY MEDICINE CLINIC | Facility: CLINIC | Age: 34
End: 2024-01-24
Payer: COMMERCIAL

## 2024-05-30 ENCOUNTER — TELEPHONE (OUTPATIENT)
Dept: FAMILY MEDICINE CLINIC | Facility: CLINIC | Age: 34
End: 2024-05-30

## 2024-05-30 NOTE — TELEPHONE ENCOUNTER
Caller: Anup Guzman    Relationship: Self    Best call back number:     Anup Guzman (Self) [phone] (Mobile)       What was the call regarding: PATIENT IS CONCERNED THAT SHE MAY HAVE BROKEN HER FINGER (AROUND THE JOINT ) - ABOUT  3-4 WKS AGO     DOES NOT HAVE TIME TO SCHEDULE AN APPT AND WANTED AN ADVISE ON HOW TO TREAT HER FINGER     Is it okay if the provider responds through MyChart:

## 2025-01-10 ENCOUNTER — E-VISIT (OUTPATIENT)
Dept: FAMILY MEDICINE CLINIC | Facility: TELEHEALTH | Age: 35
End: 2025-01-10
Payer: COMMERCIAL

## 2025-01-10 DIAGNOSIS — J01.41 ACUTE RECURRENT PANSINUSITIS: Primary | ICD-10-CM

## 2025-01-10 DIAGNOSIS — J01.41 ACUTE RECURRENT PANSINUSITIS: ICD-10-CM

## 2025-01-10 RX ORDER — BROMPHENIRAMINE MALEATE, PSEUDOEPHEDRINE HYDROCHLORIDE, AND DEXTROMETHORPHAN HYDROBROMIDE 2; 30; 10 MG/5ML; MG/5ML; MG/5ML
5 SYRUP ORAL 4 TIMES DAILY PRN
Start: 2025-01-10 | End: 2025-01-15

## 2025-01-10 RX ORDER — BROMPHENIRAMINE MALEATE, PSEUDOEPHEDRINE HYDROCHLORIDE, AND DEXTROMETHORPHAN HYDROBROMIDE 2; 30; 10 MG/5ML; MG/5ML; MG/5ML
5 SYRUP ORAL 4 TIMES DAILY PRN
Qty: 118 ML | Refills: 0 | Status: SHIPPED | OUTPATIENT
Start: 2025-01-10 | End: 2025-01-10

## 2025-01-10 NOTE — E-VISIT TREATED
Date: 01/10/2025 14:47:13  Clinician: Louise Swann  Clinician NPI: [id number]  Patient: Anup Guzman  Patient : 1990  Patient Address: [address] [place], [place] [address]  Patient Phone: [phone]  Visit Protocol: URI  Patient Summary:  Anup is a 34 year old ( : 1990 ) female who initiated a visit for cold, sinus infection, or influenza.     Anup states the symptoms started gradually 10-13 days ago. After the symptoms started, they improved and then got   worse again.   Symptom start date: 2024   The symptoms consist of a cough, facial pain or pressure, tooth pain, nasal congestion, a sore throat, and a headache.   Symptom details     Nasal secretions: The color of the mucus is green and yellow.    Cough: Anup coughs a few times an hour and the cough is more bothersome at night. Phlegm comes into the throat when coughing. Anup believes the cough is caused by post-nasal drip. The color of the phlegm is clear.     Sore throat: Anup reports having moderate throat pain (4-6 on a 10 point pain scale), does not have exudate on the tonsils, and can swallow liquids with mild discomfort. The lymph nodes in the neck are not enlarged. A rash has not appeared on the skin   since the sore throat started.     Facial pain or pressure: The facial pain or pressure feels worse when bending over or leaning forward.     Headache: The headache is mild (1-3 on a 10 point pain scale).     Tooth pain: The tooth pain is not caused by a cavity, recent dental work, or other mouth problems.      Anup denies having malaise, ear pain, rhinitis, vomiting, wheezing, chills, enlarged lymph nodes, diarrhea, myalgias, nausea, fever, and anosmia and ageusia. Anup also denies taking antibiotic medication in the past month, having recent facial or   sinus surgery in the past 60 days, and having a sinus infection within the past year. Anup is not experiencing dyspnea.   Precipitating events  Within the past  week, Anup has not been exposed to someone with strep throat. Anup has not recently been   exposed to someone with influenza. Anup has been in close contact with the following high risk individuals: children under the age of 5 and adults 65 or older.    Anup has received the influenza vaccine more than 2 weeks ago.   Pertinent COVID-19   (Coronavirus) information  Since the symptoms started, Anup has tested for COVID-19. The result of the test was positive. Date of positive COVID-19 test: 12/31/2024   Anup has not received a COVID-19 vaccine in the past year.   Pertinent medical   history  A provider has not told Anup they have kidney disease or low kidney function such as an elevated blood creatinine level or reduced eGFR. A provider has not told Anup they have liver disease such as cirrhosis.     A provider has not told   Anup to avoid NSAIDs.   Anup is unsure of their BMI.   Anup does not get yeast infections when an antibiotic is taken.   Anup does not have diabetes. Anup denies having immunosuppressive conditions (e.g., chemotherapy, HIV, organ transplant,   long-term use of steroids or other immunosuppressive medications, splenectomy). Anup does not have asthma.   Anup denies having chronic lung disease, cystic fibrosis, hypertension, long-term disabilities, mental health conditions, sickle cell disease   or thalassemia, stroke or other cardiovascular disease, substance use disorders, or tuberculosis (TB).  Anup does not smoke or use smokeless tobacco. Anup does not vape or use other e-cigarette products.   Anup denies pregnancy and denies   breastfeeding.   Weight: 125 lbs (56.7 kg)    MEDICATIONS: pseudoephedrine-acetaminophen oral, ALLERGIES: doxycycline  Clinician Response:  Dear Anup,  Based on the information provided, you have acute bacterial sinusitis, also known as a sinus infection. Most cases of sinus infections are caused by viruses and symptoms start to  improve on their own in 7-10 days. Both   viral and bacterial sinus infections usually resolve on its own. A bacterial infection may have developed if any of the following apply to you.      Symptoms of sinus infection lasting 10 days or more without showing any improvement    If you feel better after a viral upper respiratory infection such as, a cold but then suddenly feel worse with symptoms such as fever, headache, or increase in nasal discharge     Medication information  I am prescribing:       Amoxicillin-pot clavulanate 875-125 mg oral tablet. Take 1 tablet by mouth every 12 hours for 7 days. There are no refills with this prescription.      Brompheniramine-pseudoeph-DM (Bromfed DM) 2-30-10 mg/5mL oral syrup. Take 10 milliliters by mouth every 4 hours as needed for cough. Do not take more than 60mL in 24 hours. There are no refills with this prescription.     Yeast infections can be a common side effect of antibiotics. The most common symptom of a yeast infection is itchiness in and around the vagina. Other signs and symptoms include burning, redness of the vulva (the outer part of the female genitals), or   a thick, white vaginal discharge that looks like cottage cheese and does not have a bad smell.  If you become pregnant during this course of treatment, stop taking the medication and contact your primary care provider.  Unless you are allergic to the   over-the-counter medication(s) below, I recommend using:       Acetaminophen (Tylenol or store brand) oral tablet. Take 1-2 tablets by mouth every 4-6 hours to help with the discomfort.      Ibuprofen (Advil or store brand) 200 mg oral tablet. Take 1-3 tablets (200-600 mg) by mouth every 8 hours to help with the discomfort. Make sure to take the ibuprofen with food. Do not exceed 2400 mg in 24 hours.    A decongestant such as Sudafed PE or store brand.      Saline nasal spray or drops(Ocean or store brand). Use 1-2 drops or sprays in each nostril as needed  for congestion.      Fluticasone 50 mcg/actuation nasal spray. Inhale 2 sprays in each nostril 1 time per day; after 1 week, may adjust to 1 - 2 sprays in each nostril 1 time per day. This medication takes several days to start working, so keep taking it even if it doesn't   help right away.     Over-the-counter medications do not require a prescription. Ask the pharmacist if you have any questions.  Tips for using a nasal spray:     When the medication is being sprayed in your nose, point the tip of the nasal spray towards your ear.    Do not blow your nose after using the spray.    Do not lean your head back after using the spray as it will go down your throat.    Wipe the tip of the nose piece after use with a dry, clean tissue.     Self care  Steps you can take to be as comfortable as possible:     Rest.    Drink plenty of fluids.    Take a warm shower to loosen congestion.    Use a cool-mist humidifier.    Use throat lozenges.    Suck on frozen items such as popsicles.    Drink hot tea with lemon and honey.    Gargle with warm salt water (1/4 teaspoon of salt per 8 ounce glass of water).    Take a spoonful of honey to reduce your cough.     When to seek care  Please be seen in a clinic or urgent care if any of the following occur:     New symptoms develop, or symptoms become worse    Symptoms do not start to improve after 3 days of treatment     Call 911 or go to the emergency room if any of the following occur:     Difficulty breathing    If you feel that your throat is closing off    Suddenly develop a rash    Unable to swallow fluids or are drooling     It is possible to have an allergic reaction to an antibiotic even if you have not had one in the past. If you notice a new rash, significant swelling, or difficulty breathing, stop taking this medication immediately and go to a clinic or urgent care.    For the latest updates on COVID-19 (Coronavirus), please visit the Centers for Disease Control and  Prevention (CDC). Also, your state and local health department websites may provide additional guidance regarding testing and isolation recommendations for   your location.   Diagnosis: Acute bacterial sinusitis  Diagnosis ICD: J01.90    Follow up instructions: ATTENTION: If you have been prescribed medications, your prescriptions will not be sent until you choose your pharmacy.  To do so open the link within your notification, or go to [employer] and click eVisit in the menu to open your   treatment plan. From there, you can select your pharmacy at the bottom of your after visit summary. You can also go to [email or web address]  Prescriptions  Prescription: amoxicillin-pot clavulanate 875-125 mg oral tablet, take 1 tablet by mouth every 12 hours for 7 days  Sent To: [facility] [id number] - [phone] - [address] [id number]  Prescription: brompheniramine-pseudoeph-DM (Bromfed DM) 2-30-10 mg/5 mL oral syrup, take 10 milliliters by mouth every 4 hours as needed for cough  Sent To: [facility] [id number] - [phone] - [address] [id number]